# Patient Record
Sex: MALE | Race: WHITE | NOT HISPANIC OR LATINO | Employment: OTHER | URBAN - METROPOLITAN AREA
[De-identification: names, ages, dates, MRNs, and addresses within clinical notes are randomized per-mention and may not be internally consistent; named-entity substitution may affect disease eponyms.]

---

## 2017-03-28 DIAGNOSIS — E78.2 HYPERLIPIDEMIA, MIXED: ICD-10-CM

## 2017-03-28 DIAGNOSIS — E78.2 MIXED HYPERLIPIDEMIA: ICD-10-CM

## 2017-03-28 DIAGNOSIS — I21.09: ICD-10-CM

## 2017-03-28 RX ORDER — ROSUVASTATIN CALCIUM 40 MG/1
40 TABLET, COATED ORAL
Qty: 90 TAB | Refills: 3 | OUTPATIENT
Start: 2017-03-28 | End: 2017-09-26

## 2017-03-29 ENCOUNTER — TELEPHONE (OUTPATIENT)
Dept: CARDIOLOGY | Facility: MEDICAL CENTER | Age: 67
End: 2017-03-29

## 2017-05-09 DIAGNOSIS — I10 ESSENTIAL HYPERTENSION: ICD-10-CM

## 2017-05-09 DIAGNOSIS — E78.2 HYPERLIPIDEMIA, MIXED: ICD-10-CM

## 2017-05-09 DIAGNOSIS — I25.10 CORONARY ARTERY DISEASE INVOLVING NATIVE CORONARY ARTERY OF NATIVE HEART WITHOUT ANGINA PECTORIS: ICD-10-CM

## 2017-05-09 DIAGNOSIS — Z12.5 SCREENING PSA (PROSTATE SPECIFIC ANTIGEN): ICD-10-CM

## 2017-05-16 DIAGNOSIS — I10 ESSENTIAL HYPERTENSION: ICD-10-CM

## 2017-05-16 DIAGNOSIS — I21.09: ICD-10-CM

## 2017-05-16 RX ORDER — CARVEDILOL 3.12 MG/1
3.12 TABLET ORAL 2 TIMES DAILY WITH MEALS
Qty: 180 TAB | Refills: 3 | OUTPATIENT
Start: 2017-05-16 | End: 2017-09-26

## 2017-05-19 ENCOUNTER — HOSPITAL ENCOUNTER (OUTPATIENT)
Dept: LAB | Facility: MEDICAL CENTER | Age: 67
End: 2017-05-19
Attending: INTERNAL MEDICINE
Payer: MEDICARE

## 2017-05-19 DIAGNOSIS — I25.10 CORONARY ARTERY DISEASE INVOLVING NATIVE CORONARY ARTERY OF NATIVE HEART WITHOUT ANGINA PECTORIS: ICD-10-CM

## 2017-05-19 DIAGNOSIS — Z12.5 SCREENING PSA (PROSTATE SPECIFIC ANTIGEN): ICD-10-CM

## 2017-05-19 DIAGNOSIS — E78.2 HYPERLIPIDEMIA, MIXED: ICD-10-CM

## 2017-05-19 DIAGNOSIS — I10 ESSENTIAL HYPERTENSION: ICD-10-CM

## 2017-05-19 LAB
ALBUMIN SERPL BCP-MCNC: 4.4 G/DL (ref 3.2–4.9)
ALBUMIN/GLOB SERPL: 1.8 G/DL
ALP SERPL-CCNC: 43 U/L (ref 30–99)
ALT SERPL-CCNC: 21 U/L (ref 2–50)
ANION GAP SERPL CALC-SCNC: 6 MMOL/L (ref 0–11.9)
AST SERPL-CCNC: 18 U/L (ref 12–45)
BILIRUB SERPL-MCNC: 1.1 MG/DL (ref 0.1–1.5)
BUN SERPL-MCNC: 16 MG/DL (ref 8–22)
CALCIUM SERPL-MCNC: 9.2 MG/DL (ref 8.5–10.5)
CHLORIDE SERPL-SCNC: 109 MMOL/L (ref 96–112)
CHOLEST SERPL-MCNC: 123 MG/DL (ref 100–199)
CO2 SERPL-SCNC: 26 MMOL/L (ref 20–33)
CREAT SERPL-MCNC: 1.01 MG/DL (ref 0.5–1.4)
GFR SERPL CREATININE-BSD FRML MDRD: >60 ML/MIN/1.73 M 2
GLOBULIN SER CALC-MCNC: 2.5 G/DL (ref 1.9–3.5)
GLUCOSE SERPL-MCNC: 90 MG/DL (ref 65–99)
HDLC SERPL-MCNC: 50 MG/DL
LDLC SERPL CALC-MCNC: 58 MG/DL
POTASSIUM SERPL-SCNC: 3.9 MMOL/L (ref 3.6–5.5)
PROT SERPL-MCNC: 6.9 G/DL (ref 6–8.2)
PSA SERPL-MCNC: 2.72 NG/ML (ref 0–4)
SODIUM SERPL-SCNC: 141 MMOL/L (ref 135–145)
TRIGL SERPL-MCNC: 75 MG/DL (ref 0–149)

## 2017-05-19 PROCEDURE — 36415 COLL VENOUS BLD VENIPUNCTURE: CPT | Mod: GA

## 2017-05-19 PROCEDURE — 80061 LIPID PANEL: CPT

## 2017-05-19 PROCEDURE — 80053 COMPREHEN METABOLIC PANEL: CPT

## 2017-05-19 PROCEDURE — 84153 ASSAY OF PSA TOTAL: CPT | Mod: GA

## 2017-05-26 ENCOUNTER — OFFICE VISIT (OUTPATIENT)
Dept: CARDIOLOGY | Facility: MEDICAL CENTER | Age: 67
End: 2017-05-26
Payer: MEDICARE

## 2017-05-26 VITALS
WEIGHT: 183 LBS | SYSTOLIC BLOOD PRESSURE: 118 MMHG | HEIGHT: 74 IN | DIASTOLIC BLOOD PRESSURE: 80 MMHG | BODY MASS INDEX: 23.49 KG/M2 | HEART RATE: 70 BPM

## 2017-05-26 DIAGNOSIS — I25.10 CORONARY ARTERY DISEASE INVOLVING NATIVE CORONARY ARTERY OF NATIVE HEART WITHOUT ANGINA PECTORIS: Chronic | ICD-10-CM

## 2017-05-26 DIAGNOSIS — E78.2 HYPERLIPIDEMIA, MIXED: ICD-10-CM

## 2017-05-26 DIAGNOSIS — I10 ESSENTIAL HYPERTENSION: ICD-10-CM

## 2017-05-26 LAB — EKG IMPRESSION: NORMAL

## 2017-05-26 PROCEDURE — 4040F PNEUMOC VAC/ADMIN/RCVD: CPT | Mod: 8P | Performed by: INTERNAL MEDICINE

## 2017-05-26 PROCEDURE — 3017F COLORECTAL CA SCREEN DOC REV: CPT | Mod: 8P | Performed by: INTERNAL MEDICINE

## 2017-05-26 PROCEDURE — G8432 DEP SCR NOT DOC, RNG: HCPCS | Performed by: INTERNAL MEDICINE

## 2017-05-26 PROCEDURE — G8420 CALC BMI NORM PARAMETERS: HCPCS | Performed by: INTERNAL MEDICINE

## 2017-05-26 PROCEDURE — 1036F TOBACCO NON-USER: CPT | Performed by: INTERNAL MEDICINE

## 2017-05-26 PROCEDURE — 1101F PT FALLS ASSESS-DOCD LE1/YR: CPT | Mod: 8P | Performed by: INTERNAL MEDICINE

## 2017-05-26 PROCEDURE — 99214 OFFICE O/P EST MOD 30 MIN: CPT | Performed by: INTERNAL MEDICINE

## 2017-05-26 PROCEDURE — 93000 ELECTROCARDIOGRAM COMPLETE: CPT | Performed by: INTERNAL MEDICINE

## 2017-05-26 PROCEDURE — G8598 ASA/ANTIPLAT THER USED: HCPCS | Performed by: INTERNAL MEDICINE

## 2017-05-26 NOTE — MR AVS SNAPSHOT
"        Samuel Luong   2017 9:45 AM   Office Visit   MRN: [id number]    Department:  [facility]   Dept Phone:  [phone]    Description:  Male : 1950   Provider:  Beny Phillips M.D.           Reason for Visit     Follow-Up           Allergies as of 2017     Allergen Noted Reactions    Penicillins 2011       Statins [Hmg-Coa-R Inhibitors] 2011       myalgia      You were diagnosed with     Essential hypertension   [4033928]         Vital Signs     Blood Pressure Pulse Height Weight Body Mass Index Smoking Status    118/80 mmHg 70 1.892 m (6' 2.49\") 83.008 kg (183 lb) 23.19 kg/m2 Never Smoker       Basic Information     Date Of Birth Sex Race Ethnicity Preferred Language    1950 Male White Non- English      Your appointments     2018  3:00 PM   FOLLOW UP with Beny Phillips M.D.   [facility] (--)    [address], [address]  [facility] [address]   [phone]              Problem List              ICD-10-CM Priority Class Noted - Resolved    Acute MI, anterolateral wall (CMS-HCC) I21.09   Unknown - Present    Presence of drug coated stent in LAD coronary artery Z95.5   Unknown - Present    Chest pain R07.9   Unknown - Present    Hyperlipidemia, mixed E78.2   Unknown - Present    CAD (coronary artery disease) I25.10   2011 - Present    HTN (hypertension) I10   2013 - Present    Screening PSA (prostate specific antigen) Z12.5   2016 - Present      Health Maintenance        Date Due Completion Dates    IMM DTaP/Tdap/Td Vaccine (1 - Tdap) 1967 ---    COLONOSCOPY 1998 ---    IMM ZOSTER VACCINE 2008 ---    IMM PNEUMOCOCCAL 65+ (ADULT) LOW/MEDIUM RISK SERIES (1 of 2 - PCV13) 2013 ---            Results       Current Immunizations     No immunizations on file.      Below and/or attached are the medications your provider expects you to take. Review all of your home medications and " newly ordered medications with your provider and/or pharmacist. Follow medication instructions as directed by your provider and/or pharmacist. Please keep your medication list with you and share with your provider. Update the information when medications are discontinued, doses are changed, or new medications (including over-the-counter products) are added; and carry medication information at all times in the event of emergency situations     Allergies:  PENICILLINS - (reactions not documented)     STATINS - (reactions not documented)               Medications  Valid as of: May 26, 2017 - 10:14 AM    Generic Name Brand Name Tablet Size Instructions for use    Aspirin (Tab)  MG Take 325 mg by mouth every day.        Carvedilol (Tab) COREG 3.125 MG Take 1 Tab by mouth 2 times a day, with meals.        Niacin (Antihyperlipidemic) (Tab CR) NIASPAN 500 MG TAKE 1 TABLET BY MOUTH EVERY DAY        Rosuvastatin Calcium (Tab) CRESTOR 40 MG Take 1 Tab by mouth every day.        .                 Medicines prescribed today were sent to:     [facility] [address] [place] [address] & [address] [address]    Phone: [phone] Fax: [phone]    Open 24 Hours?: No      Medication refill instructions:       If your prescription bottle indicates you have medication refills left, it is not necessary to call your provider’s office. Please contact your pharmacy and they will refill your medication.    If your prescription bottle indicates you do not have any refills left, you may request refills at any time through one of the following ways: The online [employer] system (except Urgent Care), by calling your provider’s office, or by asking your pharmacy to contact your provider’s office with a refill request. Medication refills are processed only during regular business hours and may not be available until the next business day. Your provider may request additional  information or to have a follow-up visit with you prior to refilling your medication.   *Please Note: Medication refills are assigned a new Rx number when refilled electronically. Your pharmacy may indicate that no refills were authorized even though a new prescription for the same medication is available at the pharmacy. Please request the medicine by name with the pharmacy before contacting your provider for a refill.           [employer] Access Code: Activation code not generated  Current [employer] Status: Active

## 2017-05-26 NOTE — PROGRESS NOTES
Subjective:   Samuel Luong is a 67 y.o. male who presents today for our initial visit and follow-up of CAD. Former patient of Dr. Keita. Has a history of mixed hyperlipidemia and mild hypertension and experienced a and anterolateral apical MI 2009 and received a SIMA to the proximal LAD. He has preserved left ventricular ejection fraction. Tolerating his medical therapy well. Blood pressure is under excellent control. He exercises 3 times per week, also twice a week on top of this, has no cardio vascular symptoms and is very interested in his health.    Denies any other cardiovascular symptoms including chest pain, shortness of breath, dyspnea on exertion, lightheadedness, syncope or presyncope, lower extremity edema, PND, orthopnea or palpitations.    Today I reviewed the medical, surgical, social and family histories with the patient. As per HPI, otherwise noncontributory.      Past Medical History   Diagnosis Date   • Acute MI, anterolateral wall (CMS-HCC)      Robert-lateral-apical MI   • Presence of drug coated stent in LAD coronary artery      2009 3.0 x 18mm drug eluting stent too proximal LAD   • Chest pain, unspecified    • Hyperlipidemia, mixed    • CAD (coronary artery disease) 12/21/2011   • HTN (hypertension) 2/5/2013     History reviewed. No pertinent past surgical history.  Family History   Problem Relation Age of Onset   • Non-contributory Mother    • Non-contributory Father      History   Smoking status   • Never Smoker    Smokeless tobacco   • Not on file     Allergies   Allergen Reactions   • Penicillins    • Statins [Hmg-Coa-R Inhibitors]      myalgia     Outpatient Encounter Prescriptions as of 5/26/2017   Medication Sig Dispense Refill   • carvedilol (COREG) 3.125 MG Tab Take 1 Tab by mouth 2 times a day, with meals. 180 Tab 3   • rosuvastatin (CRESTOR) 40 MG tablet Take 1 Tab by mouth every day. 90 Tab 3   • niacin SR (NIASPAN) 500 MG Tab CR TAKE 1 TABLET BY MOUTH EVERY DAY 90 Tab 3  "  • aspirin (ASA) 325 MG TABS Take 325 mg by mouth every day.       No facility-administered encounter medications on file as of 5/26/2017.     Review of Systems   All other systems reviewed and are negative.       Objective:   /80 mmHg  Pulse 70  Ht 1.892 m (6' 2.49\")  Wt 83.008 kg (183 lb)  BMI 23.19 kg/m2    Physical Exam   Constitutional: He is oriented to person, place, and time. He appears well-developed and well-nourished. No distress.   Tall thin and athletic appearing   HENT:   Head: Normocephalic and atraumatic.   Mouth/Throat: Oropharynx is clear and moist.   Eyes: Conjunctivae are normal. Pupils are equal, round, and reactive to light. No scleral icterus.   Neck: No JVD present. No tracheal deviation present. No thyromegaly present.   Cardiovascular: Normal rate, regular rhythm, S1 normal, normal heart sounds and intact distal pulses.  PMI is not displaced.  Exam reveals no gallop and no friction rub.    No murmur heard.  Pulses:       Carotid pulses are 2+ on the right side, and 2+ on the left side.       Radial pulses are 2+ on the right side, and 2+ on the left side.        Dorsalis pedis pulses are 2+ on the right side, and 2+ on the left side.        Posterior tibial pulses are 2+ on the right side, and 2+ on the left side.   Pulmonary/Chest: Effort normal and breath sounds normal. He has no wheezes. He has no rales.   Abdominal: Soft. Bowel sounds are normal. He exhibits no distension and no pulsatile midline mass. There is no tenderness. There is no guarding.   Musculoskeletal: He exhibits no edema.   Neurological: He is alert and oriented to person, place, and time. No cranial nerve deficit (cranial nerves II through XII grossly intact).   Skin: Skin is warm and dry. No rash noted. He is not diaphoretic. No erythema.   Psychiatric: He has a normal mood and affect. His behavior is normal. Thought content normal.     LABS:  Lab Results   Component Value Date/Time    CHOLESTEROL, " 05/19/2017 08:08 AM    LDL 58 05/19/2017 08:08 AM    HDL 50 05/19/2017 08:08 AM    TRIGLYCERIDES 75 05/19/2017 08:08 AM       Lab Results   Component Value Date/Time    WBC 5.8 07/23/2013 06:55 AM    RBC 4.97 07/23/2013 06:55 AM    HEMOGLOBIN 15.9 07/23/2013 06:55 AM    HEMATOCRIT 48.3 07/23/2013 06:55 AM    MCV 97 07/23/2013 06:55 AM    NEUTROPHILS-POLYS 58 07/23/2013 06:55 AM    LYMPHOCYTES 30 07/23/2013 06:55 AM    MONOCYTES 9 07/23/2013 06:55 AM    EOSINOPHILS 3 07/23/2013 06:55 AM    BASOPHILS 0 07/23/2013 06:55 AM     Lab Results   Component Value Date/Time    SODIUM 141 05/19/2017 08:08 AM    POTASSIUM 3.9 05/19/2017 08:08 AM    CHLORIDE 109 05/19/2017 08:08 AM    CO2 26 05/19/2017 08:08 AM    GLUCOSE 90 05/19/2017 08:08 AM    BUN 16 05/19/2017 08:08 AM    CREATININE 1.01 05/19/2017 08:08 AM    BUN-CREATININE RATIO 13 08/14/2014 06:50 AM         Lab Results   Component Value Date/Time    ALKALINE PHOSPHATASE 43 05/19/2017 08:08 AM    AST(SGOT) 18 05/19/2017 08:08 AM    ALT(SGPT) 21 05/19/2017 08:08 AM    TOTAL BILIRUBIN 1.1 05/19/2017 08:08 AM      No results found for: BNPBTYPENAT   No results found for: TSH  No results found for: PROTHROMBTM, INR         EKG (5/26/2017):  I have personally reviewed the EKG this visit and discussed with the patient. It shows sinus rhythm 70, LVH, anterior MI old    Assessment:     1. CAD s/p SIMA LAD 2009      Anterior MI  Normal LVEF   2. Essential hypertension  EKG   3. Hyperlipidemia, mixed         Medical Decision Making:  Today's Assessment / Status / Plan:     He is doing exceptionally well. Cholesterol profile is excellent as is his metabolic panel. Diet and activity choices are exceptional. We discussed the addition of resistance training to his otherwise excellent workout regimen as he is approaching 68 years of age now.    Recommend continuing current medical therapy and following up in 1 year with labs.

## 2017-08-14 DIAGNOSIS — E78.00 PURE HYPERCHOLESTEROLEMIA: ICD-10-CM

## 2017-08-15 RX ORDER — NIACIN 500 MG/1
500 TABLET, EXTENDED RELEASE ORAL
Qty: 90 TAB | Refills: 3 | Status: SHIPPED | OUTPATIENT
Start: 2017-08-15 | End: 2018-08-15 | Stop reason: SDUPTHER

## 2017-09-24 DIAGNOSIS — I21.09: ICD-10-CM

## 2017-09-24 DIAGNOSIS — E78.2 MIXED HYPERLIPIDEMIA: ICD-10-CM

## 2017-09-24 DIAGNOSIS — I10 ESSENTIAL HYPERTENSION: ICD-10-CM

## 2017-09-24 DIAGNOSIS — E78.2 HYPERLIPIDEMIA, MIXED: ICD-10-CM

## 2017-09-26 RX ORDER — ROSUVASTATIN CALCIUM 40 MG/1
40 TABLET, COATED ORAL DAILY
Qty: 90 TAB | Refills: 3 | OUTPATIENT
Start: 2017-09-26 | End: 2018-10-09

## 2017-09-26 RX ORDER — CARVEDILOL 3.12 MG/1
3.12 TABLET ORAL 2 TIMES DAILY WITH MEALS
Qty: 180 TAB | Refills: 3 | OUTPATIENT
Start: 2017-09-26 | End: 2019-12-02

## 2017-09-26 NOTE — TELEPHONE ENCOUNTER
Patient called requesting refills. Educated that refill will be called in today. Pt appreciative of assistance.     Coreg 3.125 BID #180 with 3 refills and Crestor 40mg #90 with 3 refills called into pt's pharmacy.

## 2018-01-19 ENCOUNTER — TELEPHONE (OUTPATIENT)
Dept: CARDIOLOGY | Facility: MEDICAL CENTER | Age: 68
End: 2018-01-19

## 2018-01-19 DIAGNOSIS — Z12.5 SCREENING PSA (PROSTATE SPECIFIC ANTIGEN): ICD-10-CM

## 2018-01-19 DIAGNOSIS — I10 ESSENTIAL HYPERTENSION: ICD-10-CM

## 2018-01-19 DIAGNOSIS — I25.10 CORONARY ARTERY DISEASE INVOLVING NATIVE CORONARY ARTERY OF NATIVE HEART WITHOUT ANGINA PECTORIS: ICD-10-CM

## 2018-01-19 DIAGNOSIS — E78.2 HYPERLIPIDEMIA, MIXED: ICD-10-CM

## 2018-01-19 NOTE — TELEPHONE ENCOUNTER
Message sent to Dr. Phillips to advise on adding PSA to annual labs.    ----- Message from Kasia Bagley sent at 1/19/2018  8:00 AM PST -----  Regarding: mikanet needs labs ordered for 01/30 appt  TW/Miri      Patient needs labs ordered for his 01/30 appt. He needs PSA added to the labwork. [facility] [address]. He can be reached at [phone].

## 2018-01-20 NOTE — TELEPHONE ENCOUNTER
Orders placed for CMP, Lipid, and PSA per Dr. Phillips.     Pt notified.     Beny Phillips M.D.  Miri Orellana R.N.      OK

## 2018-01-26 ENCOUNTER — HOSPITAL ENCOUNTER (OUTPATIENT)
Dept: LAB | Facility: MEDICAL CENTER | Age: 68
End: 2018-01-26
Attending: INTERNAL MEDICINE
Payer: MEDICARE

## 2018-01-26 DIAGNOSIS — E78.2 HYPERLIPIDEMIA, MIXED: ICD-10-CM

## 2018-01-26 DIAGNOSIS — Z12.5 SCREENING PSA (PROSTATE SPECIFIC ANTIGEN): ICD-10-CM

## 2018-01-26 DIAGNOSIS — I10 ESSENTIAL HYPERTENSION: ICD-10-CM

## 2018-01-26 DIAGNOSIS — I25.10 CORONARY ARTERY DISEASE INVOLVING NATIVE CORONARY ARTERY OF NATIVE HEART WITHOUT ANGINA PECTORIS: ICD-10-CM

## 2018-01-26 LAB
ALBUMIN SERPL BCP-MCNC: 4.3 G/DL (ref 3.2–4.9)
ALBUMIN/GLOB SERPL: 1.8 G/DL
ALP SERPL-CCNC: 44 U/L (ref 30–99)
ALT SERPL-CCNC: 18 U/L (ref 2–50)
ANION GAP SERPL CALC-SCNC: 7 MMOL/L (ref 0–11.9)
AST SERPL-CCNC: 19 U/L (ref 12–45)
BILIRUB SERPL-MCNC: 1 MG/DL (ref 0.1–1.5)
BUN SERPL-MCNC: 15 MG/DL (ref 8–22)
CALCIUM SERPL-MCNC: 8.9 MG/DL (ref 8.5–10.5)
CHLORIDE SERPL-SCNC: 108 MMOL/L (ref 96–112)
CHOLEST SERPL-MCNC: 123 MG/DL (ref 100–199)
CO2 SERPL-SCNC: 26 MMOL/L (ref 20–33)
CREAT SERPL-MCNC: 1 MG/DL (ref 0.5–1.4)
GLOBULIN SER CALC-MCNC: 2.4 G/DL (ref 1.9–3.5)
GLUCOSE SERPL-MCNC: 91 MG/DL (ref 65–99)
HDLC SERPL-MCNC: 41 MG/DL
LDLC SERPL CALC-MCNC: 61 MG/DL
POTASSIUM SERPL-SCNC: 4 MMOL/L (ref 3.6–5.5)
PROT SERPL-MCNC: 6.7 G/DL (ref 6–8.2)
PSA SERPL-MCNC: 2.12 NG/ML (ref 0–4)
SODIUM SERPL-SCNC: 141 MMOL/L (ref 135–145)
TRIGL SERPL-MCNC: 106 MG/DL (ref 0–149)

## 2018-01-26 PROCEDURE — 80053 COMPREHEN METABOLIC PANEL: CPT

## 2018-01-26 PROCEDURE — 84153 ASSAY OF PSA TOTAL: CPT | Mod: GA

## 2018-01-26 PROCEDURE — 36415 COLL VENOUS BLD VENIPUNCTURE: CPT

## 2018-01-26 PROCEDURE — 80061 LIPID PANEL: CPT

## 2018-01-30 ENCOUNTER — OFFICE VISIT (OUTPATIENT)
Dept: CARDIOLOGY | Facility: MEDICAL CENTER | Age: 68
End: 2018-01-30
Payer: MEDICARE

## 2018-01-30 VITALS
WEIGHT: 186 LBS | DIASTOLIC BLOOD PRESSURE: 80 MMHG | HEART RATE: 72 BPM | HEIGHT: 74 IN | BODY MASS INDEX: 23.87 KG/M2 | SYSTOLIC BLOOD PRESSURE: 142 MMHG | OXYGEN SATURATION: 95 %

## 2018-01-30 DIAGNOSIS — I10 ESSENTIAL HYPERTENSION: ICD-10-CM

## 2018-01-30 DIAGNOSIS — E78.2 HYPERLIPIDEMIA, MIXED: ICD-10-CM

## 2018-01-30 DIAGNOSIS — I25.10 CORONARY ARTERY DISEASE INVOLVING NATIVE CORONARY ARTERY OF NATIVE HEART WITHOUT ANGINA PECTORIS: ICD-10-CM

## 2018-01-30 PROCEDURE — 99213 OFFICE O/P EST LOW 20 MIN: CPT | Performed by: INTERNAL MEDICINE
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
## 2018-04-25 ENCOUNTER — TELEPHONE (OUTPATIENT)
Dept: CARDIOLOGY | Facility: MEDICAL CENTER | Age: 68
End: 2018-04-25

## 2018-04-25 NOTE — TELEPHONE ENCOUNTER
----- Message from Marielle Das R.N. sent at 4/25/2018 10:33 AM PDT -----  Regarding: FW: insurance denied his Crestor refill    ----- Message -----  From: Kasia Bagley  Sent: 4/25/2018  10:00 AM  To: Miri Orellana R.N.  Subject: insurance denied his Crestor refill              HOWIE/Miri    Patient said his Crestor was refused by his insurance. He said he can't take the generic because it doesn't work for him as well as the name brand. He can be reached at [phone].

## 2018-04-25 NOTE — TELEPHONE ENCOUNTER
PAR approved:  Samuel Luong Mondragon: [id number] - PA Case ID: [id number]  Outcome  Approvedtoday  Request Reference Number: [id number]. CRESTOR TAB 40MG is approved through 12/31/2018. For further questions, call [phone].  DrugCrestor 40MG tablets  [facility] Medicare Part D Electronic Prior Authorization Form

## 2018-08-15 DIAGNOSIS — E78.00 PURE HYPERCHOLESTEROLEMIA: ICD-10-CM

## 2018-08-15 RX ORDER — NIACIN 500 MG/1
TABLET, EXTENDED RELEASE ORAL
Qty: 90 TAB | Refills: 3 | Status: SHIPPED | OUTPATIENT
Start: 2018-08-15 | End: 2019-08-07 | Stop reason: SDUPTHER

## 2018-09-10 ENCOUNTER — TELEPHONE (OUTPATIENT)
Dept: CARDIOLOGY | Facility: MEDICAL CENTER | Age: 68
End: 2018-09-10

## 2018-09-10 DIAGNOSIS — I10 ESSENTIAL HYPERTENSION: ICD-10-CM

## 2018-09-10 DIAGNOSIS — I25.10 CORONARY ARTERY DISEASE INVOLVING NATIVE CORONARY ARTERY OF NATIVE HEART WITHOUT ANGINA PECTORIS: ICD-10-CM

## 2018-09-10 DIAGNOSIS — Z12.5 SCREENING PSA (PROSTATE SPECIFIC ANTIGEN): ICD-10-CM

## 2018-09-10 DIAGNOSIS — E78.2 HYPERLIPIDEMIA, MIXED: ICD-10-CM

## 2018-09-10 NOTE — TELEPHONE ENCOUNTER
Annual lab orders placed per pt's request. TW ordered PSA last 1/2018, repeat lab placed at this time.     Lab slips mailed to pt's home address.     ----- Message from Nida Mendes sent at 9/10/2018  1:14 PM PDT -----  Regarding: lab slip to include PSA  Contact: [phone]  HOWIE/eloise    Pt calling to ask you to order his blood work and is requesting you include a PSA in preparation for 10/9 appt with TW.Please mail to pt.   Please call pt if questions [phone]    Please mail to:  Samuel Luong  [address] [place] [address]

## 2018-09-17 DIAGNOSIS — I21.09: ICD-10-CM

## 2018-09-17 DIAGNOSIS — I10 ESSENTIAL HYPERTENSION: ICD-10-CM

## 2018-09-18 RX ORDER — CARVEDILOL 3.12 MG/1
TABLET ORAL
Qty: 180 TAB | Refills: 2 | Status: SHIPPED | OUTPATIENT
Start: 2018-09-18 | End: 2018-10-09

## 2018-09-21 DIAGNOSIS — E78.2 HYPERLIPIDEMIA, MIXED: ICD-10-CM

## 2018-09-21 DIAGNOSIS — E78.2 MIXED HYPERLIPIDEMIA: ICD-10-CM

## 2018-09-25 ENCOUNTER — HOSPITAL ENCOUNTER (OUTPATIENT)
Dept: LAB | Facility: MEDICAL CENTER | Age: 68
End: 2018-09-25
Attending: INTERNAL MEDICINE
Payer: MEDICARE

## 2018-09-25 DIAGNOSIS — Z12.5 SCREENING PSA (PROSTATE SPECIFIC ANTIGEN): ICD-10-CM

## 2018-09-25 DIAGNOSIS — I25.10 CORONARY ARTERY DISEASE INVOLVING NATIVE CORONARY ARTERY OF NATIVE HEART WITHOUT ANGINA PECTORIS: ICD-10-CM

## 2018-09-25 DIAGNOSIS — E78.2 HYPERLIPIDEMIA, MIXED: ICD-10-CM

## 2018-09-25 DIAGNOSIS — I10 ESSENTIAL HYPERTENSION: ICD-10-CM

## 2018-09-25 LAB
ALBUMIN SERPL BCP-MCNC: 4.2 G/DL (ref 3.2–4.9)
ALBUMIN/GLOB SERPL: 1.4 G/DL
ALP SERPL-CCNC: 47 U/L (ref 30–99)
ALT SERPL-CCNC: 25 U/L (ref 2–50)
ANION GAP SERPL CALC-SCNC: 8 MMOL/L (ref 0–11.9)
AST SERPL-CCNC: 21 U/L (ref 12–45)
BILIRUB SERPL-MCNC: 0.7 MG/DL (ref 0.1–1.5)
BUN SERPL-MCNC: 16 MG/DL (ref 8–22)
CALCIUM SERPL-MCNC: 9.6 MG/DL (ref 8.5–10.5)
CHLORIDE SERPL-SCNC: 108 MMOL/L (ref 96–112)
CHOLEST SERPL-MCNC: 127 MG/DL (ref 100–199)
CO2 SERPL-SCNC: 25 MMOL/L (ref 20–33)
CREAT SERPL-MCNC: 1.08 MG/DL (ref 0.5–1.4)
FASTING STATUS PATIENT QL REPORTED: NORMAL
GLOBULIN SER CALC-MCNC: 3.1 G/DL (ref 1.9–3.5)
GLUCOSE SERPL-MCNC: 101 MG/DL (ref 65–99)
HDLC SERPL-MCNC: 40 MG/DL
LDLC SERPL CALC-MCNC: 68 MG/DL
POTASSIUM SERPL-SCNC: 4.1 MMOL/L (ref 3.6–5.5)
PROT SERPL-MCNC: 7.3 G/DL (ref 6–8.2)
SODIUM SERPL-SCNC: 141 MMOL/L (ref 135–145)
TRIGL SERPL-MCNC: 95 MG/DL (ref 0–149)

## 2018-09-25 PROCEDURE — 80053 COMPREHEN METABOLIC PANEL: CPT

## 2018-09-25 PROCEDURE — 84153 ASSAY OF PSA TOTAL: CPT | Mod: GA

## 2018-09-25 PROCEDURE — 36415 COLL VENOUS BLD VENIPUNCTURE: CPT

## 2018-09-25 PROCEDURE — 80061 LIPID PANEL: CPT

## 2018-09-25 RX ORDER — ROSUVASTATIN CALCIUM 40 MG/1
TABLET, FILM COATED ORAL
Qty: 90 TAB | Refills: 2 | Status: SHIPPED | OUTPATIENT
Start: 2018-09-25 | End: 2019-05-15

## 2018-09-26 ENCOUNTER — TELEPHONE (OUTPATIENT)
Dept: CARDIOLOGY | Facility: MEDICAL CENTER | Age: 68
End: 2018-09-26

## 2018-09-26 LAB — PSA SERPL-MCNC: 1.9 NG/ML (ref 0–4)

## 2018-09-28 DIAGNOSIS — E78.2 MIXED HYPERLIPIDEMIA: ICD-10-CM

## 2018-09-28 DIAGNOSIS — E78.2 HYPERLIPIDEMIA, MIXED: ICD-10-CM

## 2018-10-09 ENCOUNTER — OFFICE VISIT (OUTPATIENT)
Dept: CARDIOLOGY | Facility: MEDICAL CENTER | Age: 68
End: 2018-10-09
Payer: MEDICARE

## 2018-10-09 VITALS
OXYGEN SATURATION: 97 % | SYSTOLIC BLOOD PRESSURE: 177 MMHG | BODY MASS INDEX: 24.64 KG/M2 | DIASTOLIC BLOOD PRESSURE: 95 MMHG | HEIGHT: 74 IN | WEIGHT: 192 LBS | HEART RATE: 80 BPM

## 2018-10-09 DIAGNOSIS — I25.10 CORONARY ARTERY DISEASE INVOLVING NATIVE CORONARY ARTERY OF NATIVE HEART WITHOUT ANGINA PECTORIS: ICD-10-CM

## 2018-10-09 DIAGNOSIS — I10 ESSENTIAL HYPERTENSION: ICD-10-CM

## 2018-10-09 PROCEDURE — 99213 OFFICE O/P EST LOW 20 MIN: CPT | Performed by: INTERNAL MEDICINE

## 2018-10-09 RX ORDER — ROSUVASTATIN CALCIUM 40 MG/1
40 TABLET, COATED ORAL DAILY
Qty: 90 TAB | Refills: 3 | Status: SHIPPED | OUTPATIENT
Start: 2018-10-09 | End: 2019-05-15 | Stop reason: SDUPTHER

## 2018-10-09 NOTE — PROGRESS NOTES
Subjective:   Samuel Luong is a 67 y.o. male who presents today for follow-up of CAD. Former patient of Dr. Keita. Has a history of mixed hyperlipidemia and mild hypertension and experienced a and anterolateral apical MI 2009 and received a SIMA to the proximal LAD. He has preserved left ventricular ejection fraction. Tolerating his medical therapy well. Blood pressure is under excellent control. He exercises 3 times per week, also twice a week on top of this, has no cardio vascular symptoms and is very interested in his health.    Since last visit continues to feel well with no cardiac symptoms actively exercising and work on his diet and lifestyle.  He has had shingles which she is recovered from and cystoscopy which was unremarkable.    Today I reviewed the medical, surgical, social and family histories with the patient. As per HPI, otherwise noncontributory.      Past Medical History:   Diagnosis Date   • Acute MI, anterolateral wall (HCC)     Robert-lateral-apical MI   • CAD (coronary artery disease) 12/21/2011   • Chest pain, unspecified    • HTN (hypertension) 2/5/2013   • Hyperlipidemia, mixed    • Presence of drug coated stent in LAD coronary artery     2009 3.0 x 18mm drug eluting stent too proximal LAD     No past surgical history on file.  Family History   Problem Relation Age of Onset   • Non-contributory Mother    • Non-contributory Father      History   Smoking Status   • Never Smoker   Smokeless Tobacco   • Never Used     Allergies   Allergen Reactions   • Penicillins    • Statins [Hmg-Coa-R Inhibitors]      myalgia     Outpatient Encounter Prescriptions as of 10/9/2018   Medication Sig Dispense Refill   • CRESTOR 40 MG tablet TAKE 1 TABLET BY MOUTH EVERY DAY 90 Tab 2   • niacin SR (NIASPAN) 500 MG Tab CR TAKE 1 TABLET BY MOUTH EVERY DAY 90 Tab 3   • carvedilol (COREG) 3.125 MG Tab Take 1 Tab by mouth 2 times a day, with meals. 180 Tab 3   • aspirin (ASA) 325 MG TABS Take 325 mg by mouth  "every day.     • [DISCONTINUED] carvedilol (COREG) 3.125 MG Tab TAKE 1 TABLET BY MOUTH TWICE DAILY WITH MEALS 180 Tab 2   • [DISCONTINUED] rosuvastatin (CRESTOR) 40 MG tablet Take 1 Tab by mouth every day. 90 Tab 3     No facility-administered encounter medications on file as of 10/9/2018.      Review of Systems   All other systems reviewed and are negative.       Objective:   BP (!) 177/95 (BP Location: Left arm, Patient Position: Sitting)   Pulse 80   Ht 1.88 m (6' 2\")   Wt 87.1 kg (192 lb)   SpO2 97%   BMI 24.65 kg/m²     Physical Exam   Constitutional: He is oriented to person, place, and time. He appears well-developed and well-nourished. No distress.   Tall thin and athletic appearing   HENT:   Head: Normocephalic and atraumatic.   Mouth/Throat: Oropharynx is clear and moist.   Eyes: Pupils are equal, round, and reactive to light. Conjunctivae are normal. No scleral icterus.   Neck: No JVD present. No tracheal deviation present. No thyromegaly present.   Cardiovascular: Normal rate, regular rhythm, S1 normal, normal heart sounds and intact distal pulses.  PMI is not displaced.  Exam reveals no gallop and no friction rub.    No murmur heard.  Pulses:       Carotid pulses are 2+ on the right side, and 2+ on the left side.       Radial pulses are 2+ on the right side, and 2+ on the left side.        Dorsalis pedis pulses are 2+ on the right side, and 2+ on the left side.        Posterior tibial pulses are 2+ on the right side, and 2+ on the left side.   Pulmonary/Chest: Effort normal and breath sounds normal. He has no wheezes. He has no rales.   Abdominal: Soft. Bowel sounds are normal. He exhibits no distension and no pulsatile midline mass. There is no tenderness. There is no guarding.   Musculoskeletal: He exhibits no edema.   Neurological: He is alert and oriented to person, place, and time. No cranial nerve deficit (cranial nerves II through XII grossly intact).   Skin: Skin is warm and dry. No rash " noted. He is not diaphoretic. No erythema.   Psychiatric: He has a normal mood and affect. His behavior is normal. Thought content normal.   No changes in physical exam since 1/30/2018    LABS:  Lab Results   Component Value Date/Time    CHOLSTRLTOT 127 09/25/2018 07:48 AM    LDL 68 09/25/2018 07:48 AM    HDL 40 09/25/2018 07:48 AM    TRIGLYCERIDE 95 09/25/2018 07:48 AM       Lab Results   Component Value Date/Time    WBC 5.8 07/23/2013 06:55 AM    RBC 4.97 07/23/2013 06:55 AM    HEMOGLOBIN 15.9 07/23/2013 06:55 AM    HEMATOCRIT 48.3 07/23/2013 06:55 AM    MCV 97 07/23/2013 06:55 AM    NEUTSPOLYS 58 07/23/2013 06:55 AM    LYMPHOCYTES 30 07/23/2013 06:55 AM    MONOCYTES 9 07/23/2013 06:55 AM    EOSINOPHILS 3 07/23/2013 06:55 AM    BASOPHILS 0 07/23/2013 06:55 AM     Lab Results   Component Value Date/Time    SODIUM 141 09/25/2018 07:48 AM    POTASSIUM 4.1 09/25/2018 07:48 AM    CHLORIDE 108 09/25/2018 07:48 AM    CO2 25 09/25/2018 07:48 AM    GLUCOSE 101 (H) 09/25/2018 07:48 AM    BUN 16 09/25/2018 07:48 AM    CREATININE 1.08 09/25/2018 07:48 AM    CREATININE 1.05 01/22/2013 06:39 AM    BUNCREATRAT 13 08/14/2014 06:50 AM    BUNCREATRAT 14 01/22/2013 06:39 AM         Lab Results   Component Value Date/Time    ALKPHOSPHAT 47 09/25/2018 07:48 AM    ASTSGOT 21 09/25/2018 07:48 AM    ALTSGPT 25 09/25/2018 07:48 AM    TBILIRUBIN 0.7 09/25/2018 07:48 AM      No results found for: BNPBTYPENAT   No results found for: TSH  No results found for: PROTHROMBTM, INR     EKG (5/26/2017):   shows sinus rhythm 70, LVH, anterior MI old    Assessment:     1. CAD s/p SIMA LAD 2009     2. Essential hypertension         Medical Decision Making:  Today's Assessment / Status / Plan:   Is doing very well from a cardiac perspective.  Medications are well and lipids are excellent.  Blood pressure is high today but this is unusual for him.  He checks at multiple times at home and it is always normal and therefore I think this is a outlying value.   Recommended he monitor closely.  Recommended ongoing diet lifestyle management techniques and medical therapy as prescribed.  Monitor blood pressure and adjust as necessary with PCP.        Physical Exam   Constitutional: He is oriented to person, place, and time. He appears well-developed and well-nourished. No distress.   Tall thin and athletic appearing   HENT:   Head: Normocephalic and atraumatic.   Mouth/Throat: Oropharynx is clear and moist.   Eyes: Pupils are equal, round, and reactive to light. Conjunctivae are normal. No scleral icterus.   Neck: No JVD present. No tracheal deviation present. No thyromegaly present.   Cardiovascular: Normal rate, regular rhythm, S1 normal, normal heart sounds and intact distal pulses.  PMI is not displaced.  Exam reveals no gallop and no friction rub.    No murmur heard.  Pulses:       Carotid pulses are 2+ on the right side, and 2+ on the left side.       Radial pulses are 2+ on the right side, and 2+ on the left side.        Dorsalis pedis pulses are 2+ on the right side, and 2+ on the left side.        Posterior tibial pulses are 2+ on the right side, and 2+ on the left side.   Pulmonary/Chest: Effort normal and breath sounds normal. He has no wheezes. He has no rales.   Abdominal: Soft. Bowel sounds are normal. He exhibits no distension and no pulsatile midline mass. There is no tenderness. There is no guarding.   Musculoskeletal: He exhibits no edema.   Neurological: He is alert and oriented to person, place, and time. No cranial nerve deficit (cranial nerves II through XII grossly intact).   Skin: Skin is warm and dry. No rash noted. He is not diaphoretic. No erythema.   Psychiatric: He has a normal mood and affect. His behavior is normal. Thought content normal.   No changes in physical exam since 1/30/2018

## 2019-05-15 DIAGNOSIS — E78.2 HYPERLIPIDEMIA, MIXED: ICD-10-CM

## 2019-05-15 DIAGNOSIS — E78.2 MIXED HYPERLIPIDEMIA: ICD-10-CM

## 2019-05-15 RX ORDER — ROSUVASTATIN CALCIUM 40 MG/1
40 TABLET, COATED ORAL DAILY
Qty: 90 TAB | Refills: 3 | Status: SHIPPED | OUTPATIENT
Start: 2019-05-15 | End: 2020-05-28

## 2019-08-07 DIAGNOSIS — E78.00 PURE HYPERCHOLESTEROLEMIA: ICD-10-CM

## 2019-08-07 RX ORDER — NIACIN 500 MG/1
TABLET, EXTENDED RELEASE ORAL
Qty: 90 TAB | Refills: 1 | Status: SHIPPED | OUTPATIENT
Start: 2019-08-07 | End: 2020-02-26

## 2019-08-30 DIAGNOSIS — I10 ESSENTIAL HYPERTENSION: ICD-10-CM

## 2019-08-30 DIAGNOSIS — I21.09: ICD-10-CM

## 2019-08-30 RX ORDER — CARVEDILOL 3.12 MG/1
TABLET ORAL
Qty: 180 TAB | Refills: 0 | Status: SHIPPED | OUTPATIENT
Start: 2019-08-30 | End: 2019-11-28 | Stop reason: SDUPTHER

## 2019-09-19 ENCOUNTER — TELEPHONE (OUTPATIENT)
Dept: CARDIOLOGY | Facility: MEDICAL CENTER | Age: 69
End: 2019-09-19

## 2019-09-19 DIAGNOSIS — E78.00 PURE HYPERCHOLESTEROLEMIA: ICD-10-CM

## 2019-09-19 DIAGNOSIS — I10 ESSENTIAL HYPERTENSION: ICD-10-CM

## 2019-09-19 NOTE — TELEPHONE ENCOUNTER
TW pt is requesting lab orders for his 1yr FV appt on 10/15.  Preferred lab: [facility] on [facility]

## 2019-09-24 ENCOUNTER — TELEPHONE (OUTPATIENT)
Dept: CARDIOLOGY | Facility: MEDICAL CENTER | Age: 69
End: 2019-09-24

## 2019-09-24 ENCOUNTER — HOSPITAL ENCOUNTER (OUTPATIENT)
Dept: LAB | Facility: MEDICAL CENTER | Age: 69
End: 2019-09-24
Attending: INTERNAL MEDICINE
Payer: MEDICARE

## 2019-09-24 DIAGNOSIS — E78.00 PURE HYPERCHOLESTEROLEMIA: ICD-10-CM

## 2019-09-24 DIAGNOSIS — I10 ESSENTIAL HYPERTENSION: ICD-10-CM

## 2019-09-24 LAB
ANION GAP SERPL CALC-SCNC: 11 MMOL/L (ref 0–11.9)
BUN SERPL-MCNC: 15 MG/DL (ref 8–22)
CALCIUM SERPL-MCNC: 9.1 MG/DL (ref 8.5–10.5)
CHLORIDE SERPL-SCNC: 108 MMOL/L (ref 96–112)
CHOLEST SERPL-MCNC: 136 MG/DL (ref 100–199)
CO2 SERPL-SCNC: 23 MMOL/L (ref 20–33)
CREAT SERPL-MCNC: 0.99 MG/DL (ref 0.5–1.4)
GLUCOSE SERPL-MCNC: 87 MG/DL (ref 65–99)
HDLC SERPL-MCNC: 43 MG/DL
LDLC SERPL CALC-MCNC: 70 MG/DL
POTASSIUM SERPL-SCNC: 3.7 MMOL/L (ref 3.6–5.5)
SODIUM SERPL-SCNC: 142 MMOL/L (ref 135–145)
TRIGL SERPL-MCNC: 113 MG/DL (ref 0–149)

## 2019-09-24 PROCEDURE — 36415 COLL VENOUS BLD VENIPUNCTURE: CPT

## 2019-09-24 PROCEDURE — 80048 BASIC METABOLIC PNL TOTAL CA: CPT

## 2019-09-24 PROCEDURE — 80061 LIPID PANEL: CPT

## 2019-09-24 NOTE — TELEPHONE ENCOUNTER
TW    This patient would like to have a copy of his blood test results to be sent to him via his address we have on file.       Thank you,  Josselyn ROSENBERG

## 2019-10-15 ENCOUNTER — OFFICE VISIT (OUTPATIENT)
Dept: CARDIOLOGY | Facility: MEDICAL CENTER | Age: 69
End: 2019-10-15
Payer: MEDICARE

## 2019-10-15 VITALS
WEIGHT: 193.6 LBS | SYSTOLIC BLOOD PRESSURE: 138 MMHG | HEIGHT: 74 IN | DIASTOLIC BLOOD PRESSURE: 82 MMHG | OXYGEN SATURATION: 96 % | BODY MASS INDEX: 24.85 KG/M2 | HEART RATE: 72 BPM

## 2019-10-15 DIAGNOSIS — Z12.5 SCREENING PSA (PROSTATE SPECIFIC ANTIGEN): ICD-10-CM

## 2019-10-15 DIAGNOSIS — E78.2 HYPERLIPIDEMIA, MIXED: ICD-10-CM

## 2019-10-15 DIAGNOSIS — I25.10 CORONARY ARTERY DISEASE INVOLVING NATIVE CORONARY ARTERY OF NATIVE HEART WITHOUT ANGINA PECTORIS: ICD-10-CM

## 2019-10-15 PROCEDURE — 99213 OFFICE O/P EST LOW 20 MIN: CPT | Performed by: INTERNAL MEDICINE

## 2019-10-15 NOTE — PROGRESS NOTES
Subjective:   Samuel Luong is a 69 y.o. male who presents today for follow-up of CAD. Former patient of Dr. Keita. Has a history of mixed hyperlipidemia and mild hypertension and experienced a and anterolateral apical MI 2009 and received a SIMA to the proximal LAD. He has preserved left ventricular ejection fraction. Tolerating his medical therapy well. Blood pressure is under excellent control. He exercises 3 times per week, also twice a week on top of this, has no cardio vascular symptoms and is very interested in his health.    Since last visit he continues to feel well exercises in the gym vigorously 3 times per week and golfs the other 2 days.  Is absolutely no exertional symptoms.  Lipid profile is excellent blood pressure and heart rate are good.  Brings home blood pressure log that shows excellent blood pressures.      Past Medical History:   Diagnosis Date   • Acute MI, anterolateral wall (HCC)     Robert-lateral-apical MI   • CAD (coronary artery disease) 12/21/2011   • Chest pain, unspecified    • HTN (hypertension) 2/5/2013   • Hyperlipidemia, mixed    • Presence of drug coated stent in LAD coronary artery     2009 3.0 x 18mm drug eluting stent too proximal LAD     History reviewed. No pertinent surgical history.  Family History   Problem Relation Age of Onset   • Non-contributory Mother    • Non-contributory Father      Social History     Tobacco Use   Smoking Status Never Smoker   Smokeless Tobacco Never Used     Allergies   Allergen Reactions   • Penicillins    • Statins [Hmg-Coa-R Inhibitors]      myalgia     Outpatient Encounter Medications as of 10/15/2019   Medication Sig Dispense Refill   • carvedilol (COREG) 3.125 MG Tab TAKE 1 TABLET BY MOUTH TWICE DAILY WITH MEALS 180 Tab 0   • niacin SR (NIASPAN) 500 MG Tab CR TAKE 1 TABLET BY MOUTH EVERY DAY 90 Tab 1   • rosuvastatin (CRESTOR) 40 MG tablet Take 1 Tab by mouth every day. 90 Tab 3   • aspirin (ASA) 325 MG TABS Take 325 mg by mouth  "every day.     • carvedilol (COREG) 3.125 MG Tab Take 1 Tab by mouth 2 times a day, with meals. (Patient not taking: Reported on 10/15/2019) 180 Tab 3     No facility-administered encounter medications on file as of 10/15/2019.      Review of Systems   All other systems reviewed and are negative.       Objective:   /82 (BP Location: Left arm, Patient Position: Sitting, BP Cuff Size: Adult)   Pulse 72   Ht 1.88 m (6' 2\")   Wt 87.8 kg (193 lb 9.6 oz)   SpO2 96%   BMI 24.86 kg/m²     Physical Exam   Constitutional: He is oriented to person, place, and time. He appears well-developed and well-nourished. No distress.   Tall thin and athletic appearing   HENT:   Head: Normocephalic and atraumatic.   Mouth/Throat: Oropharynx is clear and moist.   Eyes: Pupils are equal, round, and reactive to light. Conjunctivae are normal. No scleral icterus.   Neck: No JVD present. No tracheal deviation present. No thyromegaly present.   Cardiovascular: Normal rate, regular rhythm, S1 normal, normal heart sounds and intact distal pulses. PMI is not displaced. Exam reveals no gallop and no friction rub.   No murmur heard.  Pulses:       Carotid pulses are 2+ on the right side, and 2+ on the left side.       Radial pulses are 2+ on the right side, and 2+ on the left side.        Dorsalis pedis pulses are 2+ on the right side, and 2+ on the left side.        Posterior tibial pulses are 2+ on the right side, and 2+ on the left side.   Pulmonary/Chest: Effort normal and breath sounds normal. He has no wheezes. He has no rales.   Abdominal: Soft. Bowel sounds are normal. He exhibits no distension and no pulsatile midline mass. There is no tenderness. There is no guarding.   Musculoskeletal: He exhibits no edema.   Neurological: He is alert and oriented to person, place, and time. No cranial nerve deficit (cranial nerves II through XII grossly intact).   Skin: Skin is warm and dry. No rash noted. He is not diaphoretic. No erythema. "   Psychiatric: He has a normal mood and affect. His behavior is normal. Thought content normal.   No changes in physical exam since 1/30/2018    LABS:  Lab Results   Component Value Date/Time    CHOLSTRLTOT 136 09/24/2019 07:20 AM    LDL 70 09/24/2019 07:20 AM    HDL 43 09/24/2019 07:20 AM    TRIGLYCERIDE 113 09/24/2019 07:20 AM       Lab Results   Component Value Date/Time    WBC 5.8 07/23/2013 06:55 AM    RBC 4.97 07/23/2013 06:55 AM    HEMOGLOBIN 15.9 07/23/2013 06:55 AM    HEMATOCRIT 48.3 07/23/2013 06:55 AM    MCV 97 07/23/2013 06:55 AM    NEUTSPOLYS 58 07/23/2013 06:55 AM    LYMPHOCYTES 30 07/23/2013 06:55 AM    MONOCYTES 9 07/23/2013 06:55 AM    EOSINOPHILS 3 07/23/2013 06:55 AM    BASOPHILS 0 07/23/2013 06:55 AM     Lab Results   Component Value Date/Time    SODIUM 142 09/24/2019 07:20 AM    POTASSIUM 3.7 09/24/2019 07:20 AM    CHLORIDE 108 09/24/2019 07:20 AM    CO2 23 09/24/2019 07:20 AM    GLUCOSE 87 09/24/2019 07:20 AM    BUN 15 09/24/2019 07:20 AM    CREATININE 0.99 09/24/2019 07:20 AM    CREATININE 1.05 01/22/2013 06:39 AM    BUNCREATRAT 13 08/14/2014 06:50 AM    BUNCREATRAT 14 01/22/2013 06:39 AM         Lab Results   Component Value Date/Time    ALKPHOSPHAT 47 09/25/2018 07:48 AM    ASTSGOT 21 09/25/2018 07:48 AM    ALTSGPT 25 09/25/2018 07:48 AM    TBILIRUBIN 0.7 09/25/2018 07:48 AM      No results found for: BNPBTYPENAT   No results found for: TSH  No results found for: PROTHROMBTM, INR     EKG (5/26/2017):   shows sinus rhythm 70, LVH, anterior MI old    Assessment:     1. CAD s/p SIMA LAD 2009  Lipid Profile    Comp Metabolic Panel   2. Hyperlipidemia, mixed  Lipid Profile    Comp Metabolic Panel   3. Screening PSA (prostate specific antigen)  PROSTATE SPECIFIC AG DIAGNOSTIC    PROSTATE SPECIFIC AG DIAGNOSTIC       Medical Decision Making:  Today's Assessment / Status / Plan:     Doing very well from a cardiac perspective with an excellent exercise capacity.  We discussed stress testing for any  symptoms or should he become less active.  His current functional capacity there is very little stress test will add to his care at this time.  We discussed these things at length.  Metabolic panel lipid profile in 12 months he requests PSA be added to his labs and I do not mind.  I request that he follow-up with this with his PCP though.  Otherwise continue current medical therapy and follow-up in 1 year.

## 2019-11-28 DIAGNOSIS — I10 ESSENTIAL HYPERTENSION: ICD-10-CM

## 2019-11-28 DIAGNOSIS — I21.09: ICD-10-CM

## 2019-12-02 RX ORDER — CARVEDILOL 3.12 MG/1
TABLET ORAL
Qty: 180 TAB | Refills: 3 | Status: SHIPPED | OUTPATIENT
Start: 2019-12-02 | End: 2020-12-07

## 2020-02-26 DIAGNOSIS — E78.00 PURE HYPERCHOLESTEROLEMIA: ICD-10-CM

## 2020-02-28 RX ORDER — NIACIN 500 MG/1
TABLET, EXTENDED RELEASE ORAL
Qty: 90 TAB | Refills: 2 | Status: SHIPPED | OUTPATIENT
Start: 2020-02-28 | End: 2021-05-21

## 2020-05-27 DIAGNOSIS — E78.2 MIXED HYPERLIPIDEMIA: ICD-10-CM

## 2020-05-27 DIAGNOSIS — E78.2 HYPERLIPIDEMIA, MIXED: ICD-10-CM

## 2020-06-03 RX ORDER — ROSUVASTATIN CALCIUM 40 MG/1
TABLET, COATED ORAL
Qty: 90 TAB | Refills: 2 | Status: SHIPPED | OUTPATIENT
Start: 2020-06-03 | End: 2021-03-26 | Stop reason: SDUPTHER

## 2020-10-26 ENCOUNTER — TELEPHONE (OUTPATIENT)
Dept: CARDIOLOGY | Facility: MEDICAL CENTER | Age: 70
End: 2020-10-26

## 2020-10-26 NOTE — TELEPHONE ENCOUNTER
Contacted patient and talked to the wife regarding his labs. He did finish them through [facility], going to send over a records request for his labs.     Patient will also bring in the labs on the day of the appointment.

## 2020-11-10 ENCOUNTER — OFFICE VISIT (OUTPATIENT)
Dept: CARDIOLOGY | Facility: MEDICAL CENTER | Age: 70
End: 2020-11-10
Payer: MEDICARE

## 2020-11-10 VITALS
BODY MASS INDEX: 24.92 KG/M2 | DIASTOLIC BLOOD PRESSURE: 80 MMHG | WEIGHT: 194.2 LBS | HEIGHT: 74 IN | OXYGEN SATURATION: 97 % | HEART RATE: 79 BPM | RESPIRATION RATE: 16 BRPM | SYSTOLIC BLOOD PRESSURE: 126 MMHG

## 2020-11-10 DIAGNOSIS — I10 ESSENTIAL HYPERTENSION: ICD-10-CM

## 2020-11-10 DIAGNOSIS — E78.2 HYPERLIPIDEMIA, MIXED: ICD-10-CM

## 2020-11-10 DIAGNOSIS — I25.10 CORONARY ARTERY DISEASE INVOLVING NATIVE CORONARY ARTERY OF NATIVE HEART WITHOUT ANGINA PECTORIS: ICD-10-CM

## 2020-11-10 PROCEDURE — 99213 OFFICE O/P EST LOW 20 MIN: CPT | Performed by: INTERNAL MEDICINE

## 2020-11-10 RX ORDER — CIPROFLOXACIN AND DEXAMETHASONE 3; 1 MG/ML; MG/ML
SUSPENSION/ DROPS AURICULAR (OTIC)
COMMUNITY
Start: 2020-10-06

## 2020-11-10 RX ORDER — EZETIMIBE 10 MG/1
10 TABLET ORAL DAILY
Qty: 90 TAB | Refills: 3 | Status: SHIPPED | OUTPATIENT
Start: 2020-11-10 | End: 2021-04-30 | Stop reason: SDUPTHER

## 2020-11-10 NOTE — PROGRESS NOTES
Subjective:   Samuel Luong is a 70y.o. male who presents today for follow-up of CAD. Former patient of Dr. Keita. Has a history of mixed hyperlipidemia and mild hypertension and experienced a and anterolateral apical MI 2009 and received a SIMA to the proximal LAD. He has preserved left ventricular ejection fraction. Tolerating his medical therapy well. Blood pressure is under excellent control. He exercises 3 times per week, also twice a week on top of this, has no cardio vascular symptoms and is very interested in his health.    Since last visit he continues to exercise vigorously 3 times a week and golf 2 times per week with no exertional symptoms getting his heart rate up to 85% of his maximum predicted each exercise session.  LDL is 82 which is up from prior.      Past Medical History:   Diagnosis Date   • Acute MI, anterolateral wall (HCC)     Robert-lateral-apical MI   • CAD (coronary artery disease) 12/21/2011   • Chest pain, unspecified    • HTN (hypertension) 2/5/2013   • Hyperlipidemia, mixed    • Presence of drug coated stent in LAD coronary artery     2009 3.0 x 18mm drug eluting stent too proximal LAD     History reviewed. No pertinent surgical history.  Family History   Problem Relation Age of Onset   • Non-contributory Mother    • Non-contributory Father      Social History     Tobacco Use   Smoking Status Never Smoker   Smokeless Tobacco Never Used     Allergies   Allergen Reactions   • Penicillins    • Statins [Hmg-Coa-R Inhibitors]      myalgia     Outpatient Encounter Medications as of 11/10/2020   Medication Sig Dispense Refill   • ezetimibe (ZETIA) 10 MG Tab Take 1 Tab by mouth every day. 90 Tab 3   • rosuvastatin (CRESTOR) 40 MG tablet TAKE 1 TABLET BY MOUTH EVERY DAY 90 Tab 2   • niacin SR (NIASPAN) 500 MG Tab CR TAKE 1 TABLET BY MOUTH EVERY DAY (Patient taking differently: Takes half each day) 90 Tab 2   • carvedilol (COREG) 3.125 MG Tab TAKE 1 TABLET BY MOUTH TWICE DAILY WITH MEALS  "180 Tab 3   • aspirin (ASA) 325 MG TABS Take 325 mg by mouth every day.     • ciprofloxacin/dexamethasone (CIPRODEX) 0.3-0.1 % Suspension UTD       No facility-administered encounter medications on file as of 11/10/2020.      Review of Systems   All other systems reviewed and are negative.       Objective:   /80 (BP Location: Left arm, Patient Position: Sitting, BP Cuff Size: Adult)   Pulse 79   Resp 16   Ht 1.88 m (6' 2\")   Wt 88.1 kg (194 lb 3.2 oz)   SpO2 97%   BMI 24.93 kg/m²     Physical Exam   Constitutional: He is oriented to person, place, and time. He appears well-developed and well-nourished. No distress.   Tall thin and athletic appearing   HENT:   Head: Normocephalic and atraumatic.   Mouth/Throat: Oropharynx is clear and moist.   Eyes: Pupils are equal, round, and reactive to light. Conjunctivae are normal. No scleral icterus.   Neck: No JVD present. No tracheal deviation present. No thyromegaly present.   Cardiovascular: Normal rate, regular rhythm, S1 normal, normal heart sounds and intact distal pulses. PMI is not displaced. Exam reveals no gallop and no friction rub.   No murmur heard.  Pulses:       Carotid pulses are 2+ on the right side and 2+ on the left side.       Radial pulses are 2+ on the right side and 2+ on the left side.        Dorsalis pedis pulses are 2+ on the right side and 2+ on the left side.        Posterior tibial pulses are 2+ on the right side and 2+ on the left side.   Pulmonary/Chest: Effort normal and breath sounds normal. He has no wheezes. He has no rales.   Abdominal: Soft. Bowel sounds are normal. He exhibits no distension and no pulsatile midline mass. There is no abdominal tenderness. There is no guarding.   Musculoskeletal:         General: No edema.   Neurological: He is alert and oriented to person, place, and time. No cranial nerve deficit (cranial nerves II through XII grossly intact).   Skin: Skin is warm and dry. No rash noted. He is not diaphoretic. " No erythema.   Psychiatric: He has a normal mood and affect. His behavior is normal. Thought content normal.       LABS:  Lab Results   Component Value Date/Time    CHOLSTRLTOT 136 09/24/2019 07:20 AM    LDL 70 09/24/2019 07:20 AM    HDL 43 09/24/2019 07:20 AM    TRIGLYCERIDE 113 09/24/2019 07:20 AM       Lab Results   Component Value Date/Time    WBC 5.8 07/23/2013 06:55 AM    RBC 4.97 07/23/2013 06:55 AM    HEMOGLOBIN 15.9 07/23/2013 06:55 AM    HEMATOCRIT 48.3 07/23/2013 06:55 AM    MCV 97 07/23/2013 06:55 AM    NEUTSPOLYS 58 07/23/2013 06:55 AM    LYMPHOCYTES 30 07/23/2013 06:55 AM    MONOCYTES 9 07/23/2013 06:55 AM    EOSINOPHILS 3 07/23/2013 06:55 AM    BASOPHILS 0 07/23/2013 06:55 AM     Lab Results   Component Value Date/Time    SODIUM 142 09/24/2019 07:20 AM    POTASSIUM 3.7 09/24/2019 07:20 AM    CHLORIDE 108 09/24/2019 07:20 AM    CO2 23 09/24/2019 07:20 AM    GLUCOSE 87 09/24/2019 07:20 AM    BUN 15 09/24/2019 07:20 AM    CREATININE 0.99 09/24/2019 07:20 AM    CREATININE 1.05 01/22/2013 06:39 AM    BUNCREATRAT 13 08/14/2014 06:50 AM    BUNCREATRAT 14 01/22/2013 06:39 AM         Lab Results   Component Value Date/Time    ALKPHOSPHAT 47 09/25/2018 07:48 AM    ASTSGOT 21 09/25/2018 07:48 AM    ALTSGPT 25 09/25/2018 07:48 AM    TBILIRUBIN 0.7 09/25/2018 07:48 AM      No results found for: BNPBTYPENAT   No results found for: TSH  No results found for: PROTHROMBTM, INR     EKG (5/26/2017):   shows sinus rhythm 70, LVH, anterior MI old    Labs (10/2/2020): CMP unremarkable aside from glucose 102, , , HDL 40, LDL 82    Assessment:     1. CAD s/p SIMA LAD 2009     2. Hyperlipidemia, mixed  ezetimibe (ZETIA) 10 MG Tab   3. Essential hypertension         Medical Decision Making:  Today's Assessment / Status / Plan:     Doing very well excellent exercise capacity.  No symptoms.  We discussed stress testing should his functional capacity drop or he experience any symptoms.  We also discussed his LDL goal  of below 70.  Recommend addition of Zetia to his maximum dose Crestor.  We also discussed diet and lifestyle.    1.  Zetia 10 mg daily  2.  Continue Crestor and other cardiac medical therapy  3.  Continue exercise and lifestyle  4.  Stress testing if the above occurs    Follow-up with cardiology yearly

## 2020-12-06 DIAGNOSIS — I10 ESSENTIAL HYPERTENSION: ICD-10-CM

## 2020-12-06 DIAGNOSIS — I21.09: ICD-10-CM

## 2020-12-07 RX ORDER — CARVEDILOL 3.12 MG/1
TABLET ORAL
Qty: 180 TAB | Refills: 3 | Status: SHIPPED | OUTPATIENT
Start: 2020-12-07 | End: 2021-11-18

## 2021-01-15 DIAGNOSIS — Z23 NEED FOR VACCINATION: ICD-10-CM

## 2021-03-26 ENCOUNTER — TELEPHONE (OUTPATIENT)
Dept: CARDIOLOGY | Facility: MEDICAL CENTER | Age: 71
End: 2021-03-26

## 2021-03-26 DIAGNOSIS — E78.2 MIXED HYPERLIPIDEMIA: ICD-10-CM

## 2021-03-26 DIAGNOSIS — E78.2 HYPERLIPIDEMIA, MIXED: ICD-10-CM

## 2021-03-26 RX ORDER — ROSUVASTATIN CALCIUM 40 MG/1
40 TABLET, COATED ORAL
Qty: 90 TABLET | Refills: 2 | Status: SHIPPED | OUTPATIENT
Start: 2021-03-26 | End: 2021-06-14

## 2021-03-26 NOTE — TELEPHONE ENCOUNTER
TW    Pt called needing a new 90 day prescription of rosuvastatin sent to [facility] pharmacy on [address] [facility]. Pt is out of medication.     Thank you

## 2021-03-26 NOTE — TELEPHONE ENCOUNTER
Prescription renewed and sent to [facility] pharmacy.

## 2021-04-30 ENCOUNTER — TELEPHONE (OUTPATIENT)
Dept: CARDIOLOGY | Facility: MEDICAL CENTER | Age: 71
End: 2021-04-30

## 2021-04-30 DIAGNOSIS — E78.2 HYPERLIPIDEMIA, MIXED: ICD-10-CM

## 2021-04-30 RX ORDER — EZETIMIBE 10 MG/1
10 TABLET ORAL DAILY
Qty: 90 TABLET | Refills: 3 | Status: SHIPPED | OUTPATIENT
Start: 2021-04-30 | End: 2022-05-13

## 2021-04-30 NOTE — TELEPHONE ENCOUNTER
TW    Pt called needing a refill of ezetimibe (ZETIA) 10 MG Tab sent to [facility] pharmacy on [address]. Pt has enough medication to get through the weekend, but will then be out.    Thank you

## 2021-05-21 DIAGNOSIS — E78.00 PURE HYPERCHOLESTEROLEMIA: ICD-10-CM

## 2021-05-26 ENCOUNTER — PATIENT MESSAGE (OUTPATIENT)
Dept: CARDIOLOGY | Facility: MEDICAL CENTER | Age: 71
End: 2021-05-26

## 2021-05-26 ENCOUNTER — TELEPHONE (OUTPATIENT)
Dept: CARDIOLOGY | Facility: MEDICAL CENTER | Age: 71
End: 2021-05-26

## 2021-05-26 DIAGNOSIS — E78.00 PURE HYPERCHOLESTEROLEMIA: ICD-10-CM

## 2021-05-26 NOTE — TELEPHONE ENCOUNTER
Samuel Luong 26 minutes ago (1:27 PM)   PF  Dr Perez made the change last year, I think.  I ahve been cutting the 500mg tablets in half since he approved the change.  ---------------------------------------------------------  No notes in Epic regarding this change.    To Dr. Phillips to advise

## 2021-05-26 NOTE — TELEPHONE ENCOUNTER
TW    Patient called and needs a refill of his Niacin. He also states they should be 250 mg. Please send to pharmacy on file.    Thank you,   Altagracia OATES

## 2021-05-27 RX ORDER — NIACIN 500 MG/1
TABLET, EXTENDED RELEASE ORAL
Qty: 90 TABLET | Refills: 3 | Status: SHIPPED | OUTPATIENT
Start: 2021-05-27 | End: 2021-05-28 | Stop reason: SDUPTHER

## 2021-05-28 DIAGNOSIS — E78.00 PURE HYPERCHOLESTEROLEMIA: ICD-10-CM

## 2021-05-28 RX ORDER — NIACIN 500 MG/1
250 TABLET, EXTENDED RELEASE ORAL
Qty: 45 TABLET | Refills: 3 | Status: SHIPPED | OUTPATIENT
Start: 2021-05-28 | End: 2021-06-01

## 2021-06-01 DIAGNOSIS — E78.00 PURE HYPERCHOLESTEROLEMIA: ICD-10-CM

## 2021-06-01 RX ORDER — NIACIN 500 MG/1
TABLET, EXTENDED RELEASE ORAL
Qty: 45 TABLET | Refills: 3 | Status: SHIPPED | OUTPATIENT
Start: 2021-06-01 | End: 2021-06-04 | Stop reason: CLARIF

## 2021-06-04 DIAGNOSIS — E78.00 PURE HYPERCHOLESTEROLEMIA: ICD-10-CM

## 2021-06-04 RX ORDER — NIACIN 500 MG/1
TABLET, EXTENDED RELEASE ORAL
Qty: 45 TABLET | Refills: 3 | Status: SHIPPED | OUTPATIENT
Start: 2021-06-04 | End: 2022-02-09 | Stop reason: SDUPTHER

## 2021-06-04 NOTE — TELEPHONE ENCOUNTER
Pt called back stating the Rx niacin SR (NIASPAN) was filled incorrectly. Pt is requesting Niacin SR 250mg tablets.   Pt states that there is some confusion on the dosage and pt does not want to cut the 500mg tablets in half any longer. Pt would like to have 250mg tablets only to make it easier.     Pt uses [facility] on [address] and [facility] [facility].     Please call back for clarification at [phone]      Thank you.

## 2021-06-14 DIAGNOSIS — E78.2 HYPERLIPIDEMIA, MIXED: ICD-10-CM

## 2021-06-14 DIAGNOSIS — E78.2 MIXED HYPERLIPIDEMIA: ICD-10-CM

## 2021-06-14 RX ORDER — ROSUVASTATIN CALCIUM 40 MG/1
40 TABLET, COATED ORAL DAILY
Qty: 90 TABLET | Refills: 3 | Status: SHIPPED | OUTPATIENT
Start: 2021-06-14 | End: 2022-05-13

## 2021-09-10 ENCOUNTER — TELEPHONE (OUTPATIENT)
Dept: CARDIOLOGY | Facility: MEDICAL CENTER | Age: 71
End: 2021-09-10

## 2021-09-10 NOTE — TELEPHONE ENCOUNTER
----- Message from Benja Ordonez Ass't sent at 9/10/2021  6:24 AM PDT -----  Regarding: Follow up  Hi Schedulers,    Please contact patient to schedule an appointment with their cardiologist.  Patient is to return to clinic around 11/2021 per TW last office note. No appointment scheduled.    Thank you,    Misa Yousif Ass't

## 2021-11-17 DIAGNOSIS — I10 ESSENTIAL HYPERTENSION: ICD-10-CM

## 2021-11-17 DIAGNOSIS — I21.09: ICD-10-CM

## 2021-11-18 RX ORDER — CARVEDILOL 3.12 MG/1
TABLET ORAL
Qty: 180 TABLET | Refills: 0 | Status: SHIPPED | OUTPATIENT
Start: 2021-11-18 | End: 2022-02-24 | Stop reason: SDUPTHER

## 2021-12-06 ENCOUNTER — PATIENT MESSAGE (OUTPATIENT)
Dept: CARDIOLOGY | Facility: MEDICAL CENTER | Age: 71
End: 2021-12-06

## 2021-12-06 DIAGNOSIS — E78.2 MIXED HYPERLIPIDEMIA: ICD-10-CM

## 2021-12-06 DIAGNOSIS — I25.10 CORONARY ARTERY DISEASE INVOLVING NATIVE CORONARY ARTERY OF NATIVE HEART WITHOUT ANGINA PECTORIS: ICD-10-CM

## 2021-12-06 DIAGNOSIS — I10 ESSENTIAL HYPERTENSION: ICD-10-CM

## 2021-12-06 DIAGNOSIS — E78.00 PURE HYPERCHOLESTEROLEMIA: ICD-10-CM

## 2021-12-13 ENCOUNTER — TELEPHONE (OUTPATIENT)
Dept: CARDIOLOGY | Facility: MEDICAL CENTER | Age: 71
End: 2021-12-13

## 2021-12-13 NOTE — TELEPHONE ENCOUNTER
Called patient. He has not done labs yet.he goes to [facility] for his lab draws. Has not received lab slips yet. Looks like Janae mailed on the 6th.    I printed and mailed them again. Also let him know if he goes to [facility] lab he does not need the lab slip.

## 2021-12-24 ENCOUNTER — HOSPITAL ENCOUNTER (OUTPATIENT)
Dept: LAB | Facility: MEDICAL CENTER | Age: 71
End: 2021-12-24
Attending: INTERNAL MEDICINE
Payer: MEDICARE

## 2021-12-24 DIAGNOSIS — E78.00 PURE HYPERCHOLESTEROLEMIA: ICD-10-CM

## 2021-12-24 DIAGNOSIS — E78.2 MIXED HYPERLIPIDEMIA: ICD-10-CM

## 2021-12-24 DIAGNOSIS — I25.10 CORONARY ARTERY DISEASE INVOLVING NATIVE CORONARY ARTERY OF NATIVE HEART WITHOUT ANGINA PECTORIS: ICD-10-CM

## 2021-12-24 DIAGNOSIS — I10 ESSENTIAL HYPERTENSION: ICD-10-CM

## 2021-12-24 LAB
ALBUMIN SERPL BCP-MCNC: 4.7 G/DL (ref 3.2–4.9)
ALBUMIN/GLOB SERPL: 2 G/DL
ALP SERPL-CCNC: 52 U/L (ref 30–99)
ALT SERPL-CCNC: 36 U/L (ref 2–50)
ANION GAP SERPL CALC-SCNC: 10 MMOL/L (ref 7–16)
AST SERPL-CCNC: 23 U/L (ref 12–45)
BILIRUB SERPL-MCNC: 0.9 MG/DL (ref 0.1–1.5)
BUN SERPL-MCNC: 13 MG/DL (ref 8–22)
CALCIUM SERPL-MCNC: 9.4 MG/DL (ref 8.5–10.5)
CHLORIDE SERPL-SCNC: 108 MMOL/L (ref 96–112)
CHOLEST SERPL-MCNC: 113 MG/DL (ref 100–199)
CO2 SERPL-SCNC: 25 MMOL/L (ref 20–33)
CREAT SERPL-MCNC: 0.95 MG/DL (ref 0.5–1.4)
FASTING STATUS PATIENT QL REPORTED: NORMAL
GLOBULIN SER CALC-MCNC: 2.3 G/DL (ref 1.9–3.5)
GLUCOSE SERPL-MCNC: 106 MG/DL (ref 65–99)
HDLC SERPL-MCNC: 43 MG/DL
LDLC SERPL CALC-MCNC: 54 MG/DL
POTASSIUM SERPL-SCNC: 4 MMOL/L (ref 3.6–5.5)
PROT SERPL-MCNC: 7 G/DL (ref 6–8.2)
SODIUM SERPL-SCNC: 143 MMOL/L (ref 135–145)
TRIGL SERPL-MCNC: 79 MG/DL (ref 0–149)

## 2021-12-24 PROCEDURE — 80061 LIPID PANEL: CPT

## 2021-12-24 PROCEDURE — 80053 COMPREHEN METABOLIC PANEL: CPT

## 2021-12-24 PROCEDURE — 36415 COLL VENOUS BLD VENIPUNCTURE: CPT

## 2022-02-09 ENCOUNTER — OFFICE VISIT (OUTPATIENT)
Dept: CARDIOLOGY | Facility: MEDICAL CENTER | Age: 72
End: 2022-02-09
Payer: MEDICARE

## 2022-02-09 VITALS
DIASTOLIC BLOOD PRESSURE: 88 MMHG | SYSTOLIC BLOOD PRESSURE: 132 MMHG | HEIGHT: 75 IN | BODY MASS INDEX: 23.87 KG/M2 | OXYGEN SATURATION: 97 % | RESPIRATION RATE: 14 BRPM | HEART RATE: 74 BPM | WEIGHT: 192 LBS

## 2022-02-09 DIAGNOSIS — I25.10 CORONARY ARTERY DISEASE INVOLVING NATIVE CORONARY ARTERY OF NATIVE HEART WITHOUT ANGINA PECTORIS: ICD-10-CM

## 2022-02-09 DIAGNOSIS — E78.2 MIXED HYPERLIPIDEMIA: ICD-10-CM

## 2022-02-09 DIAGNOSIS — E78.00 PURE HYPERCHOLESTEROLEMIA: ICD-10-CM

## 2022-02-09 DIAGNOSIS — I10 ESSENTIAL HYPERTENSION: ICD-10-CM

## 2022-02-09 PROCEDURE — 99213 OFFICE O/P EST LOW 20 MIN: CPT | Performed by: INTERNAL MEDICINE

## 2022-02-09 RX ORDER — NIACIN 500 MG/1
TABLET, EXTENDED RELEASE ORAL
Qty: 45 TABLET | Refills: 3 | Status: SHIPPED | OUTPATIENT
Start: 2022-02-09 | End: 2022-11-17 | Stop reason: SDUPTHER

## 2022-02-09 NOTE — PROGRESS NOTES
Subjective:   Samuel Luong is a 71 y.o. male who presents today for follow-up of CAD. Former patient of Dr. Keita. Has a history of mixed hyperlipidemia and mild hypertension and experienced a and anterolateral apical MI 2009 and received a SIMA to the proximal LAD. He has preserved left ventricular ejection fraction.     Continues to exercise vigorously and golf with no symptoms and has improved his cough came to under 80.  Takes his medications as directed and his lipid profile is now excellent with the addition of Zetia.      Past Medical History:   Diagnosis Date   • Acute MI, anterolateral wall (HCC)     Robert-lateral-apical MI   • CAD (coronary artery disease) 12/21/2011   • Chest pain, unspecified    • HTN (hypertension) 2/5/2013   • Hyperlipidemia, mixed    • Presence of drug coated stent in LAD coronary artery     2009 3.0 x 18mm drug eluting stent too proximal LAD     History reviewed. No pertinent surgical history.  Family History   Problem Relation Age of Onset   • Non-contributory Mother    • Non-contributory Father      Social History     Tobacco Use   Smoking Status Never Smoker   Smokeless Tobacco Never Used     Allergies   Allergen Reactions   • Penicillins    • Statins [Hmg-Coa-R Inhibitors]      myalgia     Outpatient Encounter Medications as of 2/9/2022   Medication Sig Dispense Refill   • niacin SR (NIASPAN) 500 MG Tab CR Take 1/2 tablet daily 45 Tablet 3   • carvedilol (COREG) 3.125 MG Tab TAKE 1 TABLET BY MOUTH TWICE DAILY WITH MEALS 180 Tablet 0   • rosuvastatin (CRESTOR) 40 MG tablet Take 1 tablet by mouth every day. BRAND NAME ONLY / PATIENT 90 tablet 3   • ezetimibe (ZETIA) 10 MG Tab Take 1 tablet by mouth every day. 90 tablet 3   • ciprofloxacin/dexamethasone (CIPRODEX) 0.3-0.1 % Suspension UTD     • aspirin (ASA) 325 MG TABS Take 325 mg by mouth every day.     • [DISCONTINUED] niacin SR (NIASPAN) 500 MG Tab CR Take 1/2 tablet daily 45 tablet 3     No facility-administered  "encounter medications on file as of 2/9/2022.     Review of Systems   All other systems reviewed and are negative.       Objective:   /88 (BP Location: Left arm, Patient Position: Sitting, BP Cuff Size: Adult)   Pulse 74   Resp 14   Ht 1.892 m (6' 2.5\")   Wt 87.1 kg (192 lb)   SpO2 97%   BMI 24.32 kg/m²     Physical Exam  Constitutional:       General: He is not in acute distress.     Appearance: He is well-developed. He is not diaphoretic.      Comments: Tall thin and athletic appearing   HENT:      Head: Normocephalic and atraumatic.   Eyes:      General: No scleral icterus.     Conjunctiva/sclera: Conjunctivae normal.      Pupils: Pupils are equal, round, and reactive to light.   Neck:      Thyroid: No thyromegaly.      Vascular: No JVD.      Trachea: No tracheal deviation.   Cardiovascular:      Rate and Rhythm: Normal rate and regular rhythm.      Chest Wall: PMI is not displaced.      Pulses:           Carotid pulses are 2+ on the right side and 2+ on the left side.       Radial pulses are 2+ on the right side and 2+ on the left side.        Dorsalis pedis pulses are 2+ on the right side and 2+ on the left side.        Posterior tibial pulses are 2+ on the right side and 2+ on the left side.      Heart sounds: Normal heart sounds and S1 normal. No murmur heard.  No friction rub. No gallop.    Pulmonary:      Effort: Pulmonary effort is normal.      Breath sounds: Normal breath sounds. No wheezing or rales.   Abdominal:      General: Bowel sounds are normal. There is no distension.      Palpations: Abdomen is soft. There is no pulsatile mass.      Tenderness: There is no abdominal tenderness. There is no guarding.   Skin:     General: Skin is warm and dry.      Findings: No erythema or rash.   Neurological:      Mental Status: He is alert and oriented to person, place, and time.      Cranial Nerves: No cranial nerve deficit (cranial nerves II through XII grossly intact).   Psychiatric:         " Behavior: Behavior normal.         Thought Content: Thought content normal.         LABS:  Lab Results   Component Value Date/Time    CHOLSTRLTOT 113 12/24/2021 09:33 AM    LDL 54 12/24/2021 09:33 AM    HDL 43 12/24/2021 09:33 AM    TRIGLYCERIDE 79 12/24/2021 09:33 AM       Lab Results   Component Value Date/Time    WBC 5.8 07/23/2013 06:55 AM    RBC 4.97 07/23/2013 06:55 AM    HEMOGLOBIN 15.9 07/23/2013 06:55 AM    HEMATOCRIT 48.3 07/23/2013 06:55 AM    MCV 97 07/23/2013 06:55 AM    NEUTSPOLYS 58 07/23/2013 06:55 AM    LYMPHOCYTES 30 07/23/2013 06:55 AM    MONOCYTES 9 07/23/2013 06:55 AM    EOSINOPHILS 3 07/23/2013 06:55 AM    BASOPHILS 0 07/23/2013 06:55 AM     Lab Results   Component Value Date/Time    SODIUM 143 12/24/2021 09:33 AM    POTASSIUM 4.0 12/24/2021 09:33 AM    CHLORIDE 108 12/24/2021 09:33 AM    CO2 25 12/24/2021 09:33 AM    GLUCOSE 106 (H) 12/24/2021 09:33 AM    BUN 13 12/24/2021 09:33 AM    CREATININE 0.95 12/24/2021 09:33 AM    CREATININE 1.05 01/22/2013 06:39 AM    BUNCREATRAT 13 08/14/2014 06:50 AM    BUNCREATRAT 14 01/22/2013 06:39 AM         Lab Results   Component Value Date/Time    ALKPHOSPHAT 52 12/24/2021 09:33 AM    ASTSGOT 23 12/24/2021 09:33 AM    ALTSGPT 36 12/24/2021 09:33 AM    TBILIRUBIN 0.9 12/24/2021 09:33 AM      No results found for: BNPBTYPENAT   No results found for: TSH  No results found for: PROTHROMBTM, INR     EKG (5/26/2017):   shows sinus rhythm 70, LVH, anterior MI old    Labs (10/2/2020): CMP unremarkable aside from glucose 102, , , HDL 40, LDL 82    Assessment:     1. Coronary artery disease involving native coronary artery of native heart without angina pectoris     2. Pure hypercholesterolemia  niacin SR (NIASPAN) 500 MG Tab CR   3. Mixed hyperlipidemia     4. Essential hypertension         Medical Decision Making:  Today's Assessment / Status / Plan:     Doing very well excellent exercise capacity.  No symptoms.  We discussed stress testing should his  functional capacity drop or he experience any symptoms.  LDL is now at goal.    Recommend continuing current medical therapy continue to be active as tolerated and make healthful lifestyle choices.  Follow-up with cardiology yearly.

## 2022-02-23 ENCOUNTER — TELEPHONE (OUTPATIENT)
Dept: CARDIOLOGY | Facility: MEDICAL CENTER | Age: 72
End: 2022-02-23
Payer: MEDICARE

## 2022-02-23 DIAGNOSIS — I10 ESSENTIAL HYPERTENSION: ICD-10-CM

## 2022-02-23 DIAGNOSIS — I21.09: ICD-10-CM

## 2022-02-23 NOTE — TELEPHONE ENCOUNTER
TW    Patient called to advise they have about a week left on their carvedilol (COREG) 3.125 MG Tab. They would like it sent to the pharmacy on file.     Thank you,  Josselyn ROSENBERG

## 2022-02-24 RX ORDER — CARVEDILOL 3.12 MG/1
3.12 TABLET ORAL 2 TIMES DAILY WITH MEALS
Qty: 180 TABLET | Refills: 3 | Status: SHIPPED | OUTPATIENT
Start: 2022-02-24 | End: 2023-02-10 | Stop reason: SDUPTHER

## 2022-02-24 NOTE — TELEPHONE ENCOUNTER
RX renewed and sent to [facility] pharmacy on file.

## 2022-10-31 DIAGNOSIS — I10 ESSENTIAL HYPERTENSION: ICD-10-CM

## 2022-10-31 DIAGNOSIS — E78.2 MIXED HYPERLIPIDEMIA: ICD-10-CM

## 2022-10-31 DIAGNOSIS — E78.00 PURE HYPERCHOLESTEROLEMIA: ICD-10-CM

## 2022-10-31 DIAGNOSIS — I21.09: ICD-10-CM

## 2022-10-31 DIAGNOSIS — E78.2 HYPERLIPIDEMIA, MIXED: ICD-10-CM

## 2022-10-31 NOTE — TELEPHONE ENCOUNTER
Is the patient due for a refill? No    Was the patient seen the past year? Yes    Date of last office visit: 2/9/2022    Does the patient have an upcoming appointment?  No   If yes, When?     Provider to refill: CW    Does the patients insurance require a 100 day supply?  No

## 2022-11-07 ENCOUNTER — TELEPHONE (OUTPATIENT)
Dept: CARDIOLOGY | Facility: MEDICAL CENTER | Age: 72
End: 2022-11-07
Payer: MEDICARE

## 2022-11-07 DIAGNOSIS — E78.2 MIXED HYPERLIPIDEMIA: ICD-10-CM

## 2022-11-07 DIAGNOSIS — I10 ESSENTIAL HYPERTENSION: ICD-10-CM

## 2022-11-07 RX ORDER — EZETIMIBE 10 MG/1
10 TABLET ORAL DAILY
Qty: 90 TABLET | Refills: 3 | OUTPATIENT
Start: 2022-11-07

## 2022-11-07 RX ORDER — ROSUVASTATIN CALCIUM 40 MG/1
40 TABLET, COATED ORAL
Qty: 90 TABLET | Refills: 3 | OUTPATIENT
Start: 2022-11-07

## 2022-11-07 RX ORDER — NIACIN 500 MG/1
TABLET, EXTENDED RELEASE ORAL
Qty: 45 TABLET | Refills: 3 | OUTPATIENT
Start: 2022-11-07 | End: 2023-10-31

## 2022-11-07 RX ORDER — CARVEDILOL 3.12 MG/1
3.12 TABLET ORAL 2 TIMES DAILY WITH MEALS
Qty: 180 TABLET | Refills: 3 | OUTPATIENT
Start: 2022-11-07

## 2022-11-07 NOTE — TELEPHONE ENCOUNTER
HOWIE    Caller:Sherif Baker    Where labs will be completed: [facility]  Requesting PSA also    Fax/Phone number for lab:  Ernestine Garza    Upcoming Appointment Date:2/7/22    Callback Number: [phone]    Thank you,   Olivia CHAVEZ

## 2022-11-08 ENCOUNTER — PATIENT MESSAGE (OUTPATIENT)
Dept: HEALTH INFORMATION MANAGEMENT | Facility: OTHER | Age: 72
End: 2022-11-08

## 2022-11-09 ENCOUNTER — PATIENT MESSAGE (OUTPATIENT)
Dept: CARDIOLOGY | Facility: MEDICAL CENTER | Age: 72
End: 2022-11-09
Payer: MEDICARE

## 2022-11-09 NOTE — TELEPHONE ENCOUNTER
Labs ordered, lab slips printed, and mailed to pt mailing address.    MyChart sent regarding findings.

## 2023-01-31 DIAGNOSIS — E78.2 MIXED HYPERLIPIDEMIA: ICD-10-CM

## 2023-01-31 RX ORDER — ROSUVASTATIN CALCIUM 40 MG/1
40 TABLET, COATED ORAL
Qty: 90 TABLET | Refills: 0 | Status: SHIPPED | OUTPATIENT
Start: 2023-01-31 | End: 2023-05-01 | Stop reason: SDUPTHER

## 2023-01-31 NOTE — TELEPHONE ENCOUNTER
Is the patient due for a refill? Yes    Was the patient seen the past year? Yes    Date of last office visit: 2/9/22    Does the patient have an upcoming appointment?  Yes   If yes, When? 2/7/23    Provider to refill:TW2    Does the patients insurance require a 100 day supply?  No

## 2023-02-01 ENCOUNTER — PATIENT MESSAGE (OUTPATIENT)
Dept: CARDIOLOGY | Facility: MEDICAL CENTER | Age: 73
End: 2023-02-01
Payer: MEDICARE

## 2023-02-01 DIAGNOSIS — E78.00 PURE HYPERCHOLESTEROLEMIA: ICD-10-CM

## 2023-02-03 RX ORDER — NIACIN 500 MG/1
TABLET, EXTENDED RELEASE ORAL
Qty: 45 TABLET | Refills: 0 | Status: SHIPPED | OUTPATIENT
Start: 2023-02-03 | End: 2023-05-08 | Stop reason: SDUPTHER

## 2023-02-07 ENCOUNTER — OFFICE VISIT (OUTPATIENT)
Dept: CARDIOLOGY | Facility: MEDICAL CENTER | Age: 73
End: 2023-02-07
Payer: MEDICARE

## 2023-02-07 VITALS
OXYGEN SATURATION: 96 % | SYSTOLIC BLOOD PRESSURE: 150 MMHG | DIASTOLIC BLOOD PRESSURE: 84 MMHG | RESPIRATION RATE: 16 BRPM | HEIGHT: 75 IN | HEART RATE: 76 BPM | WEIGHT: 187 LBS | BODY MASS INDEX: 23.25 KG/M2

## 2023-02-07 DIAGNOSIS — I25.10 CORONARY ARTERY DISEASE INVOLVING NATIVE CORONARY ARTERY OF NATIVE HEART WITHOUT ANGINA PECTORIS: ICD-10-CM

## 2023-02-07 DIAGNOSIS — I10 ESSENTIAL HYPERTENSION: ICD-10-CM

## 2023-02-07 DIAGNOSIS — E78.2 MIXED HYPERLIPIDEMIA: ICD-10-CM

## 2023-02-07 PROCEDURE — 99213 OFFICE O/P EST LOW 20 MIN: CPT | Performed by: INTERNAL MEDICINE

## 2023-02-07 NOTE — PROGRESS NOTES
Subjective:   Samuel Luong is a 72 y.o. male who presents today for follow-up of CAD.  Has a history of mixed hyperlipidemia and mild hypertension and experienced anterolateral apical MI 2009 and received a SIMA to the proximal LAD. He has preserved left ventricular ejection fraction.     Since last visit doing well no complaints remains very active.    Past Medical History:   Diagnosis Date    Acute MI, anterolateral wall (HCC)     Robert-lateral-apical MI    CAD (coronary artery disease) 12/21/2011    Chest pain, unspecified     HTN (hypertension) 2/5/2013    Hyperlipidemia, mixed     Presence of drug coated stent in LAD coronary artery     2009 3.0 x 18mm drug eluting stent too proximal LAD     History reviewed. No pertinent surgical history.  Family History   Problem Relation Age of Onset    Non-contributory Mother     Non-contributory Father      Social History     Tobacco Use   Smoking Status Never   Smokeless Tobacco Never     Allergies   Allergen Reactions    Penicillins     Statins [Hmg-Coa-R Inhibitors]      myalgia     Outpatient Encounter Medications as of 2/7/2023   Medication Sig Dispense Refill    niacin SR (NIASPAN) 500 MG Tab CR Take 1/2 tablet daily. Patient needs to comply with follow up visit for further refills! 45 Tablet 0    rosuvastatin (CRESTOR) 40 MG tablet Take 1 Tablet by mouth every day. Please keep follow up appointment for further refills. Thank you! 90 Tablet 0    ezetimibe (ZETIA) 10 MG Tab TAKE 1 TABLET BY MOUTH EVERY DAY 90 Tablet 2    carvedilol (COREG) 3.125 MG Tab Take 1 Tablet by mouth 2 times a day with meals. 180 Tablet 3    ciprofloxacin/dexamethasone (CIPRODEX) 0.3-0.1 % Suspension UTD      aspirin (ASA) 325 MG TABS Take 325 mg by mouth every day.      triazolam (HALCION) 0.25 MG Tab TAKE 1 TABLET BY MOUTH 1 HOUR BEFORE DENTAL APPOINTMENT (Patient not taking: Reported on 2/7/2023)       No facility-administered encounter medications on file as of 2/7/2023.     Review  "of Systems   All other systems reviewed and are negative.     Objective:   BP (!) 150/84 (BP Location: Left arm, Patient Position: Sitting)   Pulse 76   Resp 16   Ht 1.892 m (6' 2.5\")   Wt 84.8 kg (187 lb)   SpO2 96%   BMI 23.69 kg/m²     Physical Exam  Constitutional:       General: He is not in acute distress.     Appearance: He is well-developed. He is not diaphoretic.      Comments: Tall thin and athletic appearing   HENT:      Head: Normocephalic and atraumatic.   Eyes:      General: No scleral icterus.     Conjunctiva/sclera: Conjunctivae normal.      Pupils: Pupils are equal, round, and reactive to light.   Neck:      Thyroid: No thyromegaly.      Vascular: No JVD.      Trachea: No tracheal deviation.   Cardiovascular:      Rate and Rhythm: Normal rate and regular rhythm.      Chest Wall: PMI is not displaced.      Pulses:           Carotid pulses are 2+ on the right side and 2+ on the left side.       Radial pulses are 2+ on the right side and 2+ on the left side.        Dorsalis pedis pulses are 2+ on the right side and 2+ on the left side.        Posterior tibial pulses are 2+ on the right side and 2+ on the left side.      Heart sounds: Normal heart sounds and S1 normal. No murmur heard.    No friction rub. No gallop.   Pulmonary:      Effort: Pulmonary effort is normal.      Breath sounds: Normal breath sounds. No wheezing or rales.   Abdominal:      General: Bowel sounds are normal. There is no distension.      Palpations: Abdomen is soft. There is no pulsatile mass.      Tenderness: There is no abdominal tenderness. There is no guarding.   Skin:     General: Skin is warm and dry.      Findings: No erythema or rash.   Neurological:      Mental Status: He is alert and oriented to person, place, and time.      Cranial Nerves: No cranial nerve deficit (cranial nerves II through XII grossly intact).   Psychiatric:         Behavior: Behavior normal.         Thought Content: Thought content normal. "       LABS:  Lab Results   Component Value Date/Time    CHOLSTRLTOT 113 12/24/2021 09:33 AM    LDL 54 12/24/2021 09:33 AM    HDL 43 12/24/2021 09:33 AM    TRIGLYCERIDE 79 12/24/2021 09:33 AM       Lab Results   Component Value Date/Time    WBC 5.8 07/23/2013 06:55 AM    RBC 4.97 07/23/2013 06:55 AM    HEMOGLOBIN 15.9 07/23/2013 06:55 AM    HEMATOCRIT 48.3 07/23/2013 06:55 AM    MCV 97 07/23/2013 06:55 AM    NEUTSPOLYS 58 07/23/2013 06:55 AM    LYMPHOCYTES 30 07/23/2013 06:55 AM    MONOCYTES 9 07/23/2013 06:55 AM    EOSINOPHILS 3 07/23/2013 06:55 AM    BASOPHILS 0 07/23/2013 06:55 AM     Lab Results   Component Value Date/Time    SODIUM 143 12/24/2021 09:33 AM    POTASSIUM 4.0 12/24/2021 09:33 AM    CHLORIDE 108 12/24/2021 09:33 AM    CO2 25 12/24/2021 09:33 AM    GLUCOSE 106 (H) 12/24/2021 09:33 AM    BUN 13 12/24/2021 09:33 AM    CREATININE 0.95 12/24/2021 09:33 AM    CREATININE 1.05 01/22/2013 06:39 AM    BUNCREATRAT 13 08/14/2014 06:50 AM    BUNCREATRAT 14 01/22/2013 06:39 AM         Lab Results   Component Value Date/Time    ALKPHOSPHAT 52 12/24/2021 09:33 AM    ASTSGOT 23 12/24/2021 09:33 AM    ALTSGPT 36 12/24/2021 09:33 AM    TBILIRUBIN 0.9 12/24/2021 09:33 AM      No results found for: BNPBTYPENAT   No results found for: TSH  No results found for: PROTHROMBTM, INR       Assessment:     1. Coronary artery disease involving native coronary artery of native heart without angina pectoris        2. Essential hypertension        3. Hyperlipidemia, mixed            Medical Decision Making:  Today's Assessment / Status / Plan:     Doing well achieving goal LDL.  Discussed diet and exercise.  Offered routine stress testing defers.  Labs in media

## 2023-02-10 DIAGNOSIS — I10 ESSENTIAL HYPERTENSION: ICD-10-CM

## 2023-02-10 DIAGNOSIS — I21.09: ICD-10-CM

## 2023-02-10 NOTE — TELEPHONE ENCOUNTER
Is the patient due for a refill? Yes    Was the patient seen the past year? Yes    Date of last office visit: 2/7/2023    Does the patient have an upcoming appointment?  No    Provider to refill:TW    Does the patients insurance require a 100 day supply?  No

## 2023-02-13 RX ORDER — CARVEDILOL 3.12 MG/1
3.12 TABLET ORAL 2 TIMES DAILY WITH MEALS
Qty: 180 TABLET | Refills: 3 | Status: SHIPPED | OUTPATIENT
Start: 2023-02-13 | End: 2024-02-06 | Stop reason: SDUPTHER

## 2023-04-27 DIAGNOSIS — E78.2 HYPERLIPIDEMIA, MIXED: ICD-10-CM

## 2023-04-28 NOTE — TELEPHONE ENCOUNTER
Is the patient due for a refill? Yes    Was the patient seen the past year? Yes    Date of last office visit: 2/7/23    Does the patient have an upcoming appointment?  No      Provider to refill:TW    Does the patients insurance require a 100 day supply?  No

## 2023-05-01 DIAGNOSIS — E78.2 MIXED HYPERLIPIDEMIA: ICD-10-CM

## 2023-05-01 NOTE — TELEPHONE ENCOUNTER
Is the patient due for a refill? Yes    Was the patient seen the past year? Yes    Date of last office visit: 2/7/2023    Does the patient have an upcoming appointment?  No   If yes, When?     Provider to refill:TW    Does the patients insurance require a 100 day supply?  No

## 2023-05-02 ENCOUNTER — TELEPHONE (OUTPATIENT)
Dept: CARDIOLOGY | Facility: MEDICAL CENTER | Age: 73
End: 2023-05-02
Payer: MEDICARE

## 2023-05-02 RX ORDER — EZETIMIBE 10 MG/1
10 TABLET ORAL DAILY
Qty: 90 TABLET | Refills: 3 | Status: SHIPPED | OUTPATIENT
Start: 2023-05-02

## 2023-05-02 RX ORDER — ROSUVASTATIN CALCIUM 40 MG/1
40 TABLET, COATED ORAL
Qty: 90 TABLET | Refills: 2 | Status: SHIPPED | OUTPATIENT
Start: 2023-05-02 | End: 2024-01-29 | Stop reason: SDUPTHER

## 2023-05-02 NOTE — TELEPHONE ENCOUNTER
TW    Caller: Samuel Luong    Medication Name and Dosage:  rosuvastatin (CRESTOR) 40 MG tablet  ezetimibe (ZETIA) 10 MG Tab    Medication amount left: Rosuvastatin okay but Ezetimie patietn only has 1 left.    Preferred Pharmacy:  [id number] - [place], [place] - [address] AT [facility]   [address], [place] [address]   Phone:  [phone]  Fax:  [phone]   SHAMIKA #:  [id number]    Other questions (Topic): Ezetimibe was inquired about last week but patient or pharmacy haven't heard back and now patient is almost out.    Callback Number (Will only call for issues): [phone]    Thank you,  -Keena CAMPOS

## 2023-05-08 DIAGNOSIS — E78.00 PURE HYPERCHOLESTEROLEMIA: ICD-10-CM

## 2023-05-09 RX ORDER — NIACIN 500 MG/1
TABLET, EXTENDED RELEASE ORAL
Qty: 45 TABLET | Refills: 2 | Status: SHIPPED | OUTPATIENT
Start: 2023-05-09 | End: 2024-02-22 | Stop reason: SDUPTHER

## 2023-08-28 DIAGNOSIS — I25.10 CORONARY ARTERY DISEASE INVOLVING NATIVE CORONARY ARTERY OF NATIVE HEART WITHOUT ANGINA PECTORIS: ICD-10-CM

## 2023-11-29 ENCOUNTER — PATIENT MESSAGE (OUTPATIENT)
Dept: HEALTH INFORMATION MANAGEMENT | Facility: OTHER | Age: 73
End: 2023-11-29

## 2024-01-22 ENCOUNTER — TELEPHONE (OUTPATIENT)
Dept: CARDIOLOGY | Facility: MEDICAL CENTER | Age: 74
End: 2024-01-22
Payer: MEDICARE

## 2024-01-22 NOTE — TELEPHONE ENCOUNTER
TW    Caller: Samuel Luong     Topic/issue: Patient called and is requesting copies of his lab orders to be mailed and/or emailed over to him.     Please advise.    Callback Number: [phone]    Thank you,    Monisha GÓMEZ

## 2024-01-24 NOTE — TELEPHONE ENCOUNTER
HOWIE        Caller: Samuel Luong      Topic/issue: Patient was requesting his lab orders that were mailed to him to be faxed over to [facility]    Fax# [phone]        Callback Number: [phone]    Thank you    -Parth CAMPOS

## 2024-01-29 DIAGNOSIS — E78.2 MIXED HYPERLIPIDEMIA: ICD-10-CM

## 2024-01-29 RX ORDER — ROSUVASTATIN CALCIUM 40 MG/1
40 TABLET, COATED ORAL
Qty: 90 TABLET | Refills: 2 | Status: SHIPPED | OUTPATIENT
Start: 2024-01-29

## 2024-01-29 NOTE — TELEPHONE ENCOUNTER
Received request via: Patient    Was the patient seen in the last year in this department? Yes    Does the patient have an active prescription (recently filled or refills available) for medication(s) requested?  They are needing a new script    Pharm[facility] Name: [facility]    Does the patient have [facility] Plus and need 100 day supply (blood pressure, diabetes and cholesterol meds only)? Patient does not have SCP

## 2024-02-05 ENCOUNTER — TELEPHONE (OUTPATIENT)
Dept: CARDIOLOGY | Facility: MEDICAL CENTER | Age: 74
End: 2024-02-05
Payer: MEDICARE

## 2024-02-06 DIAGNOSIS — I10 ESSENTIAL HYPERTENSION: ICD-10-CM

## 2024-02-06 DIAGNOSIS — I21.09: ICD-10-CM

## 2024-02-06 RX ORDER — CARVEDILOL 3.12 MG/1
3.12 TABLET ORAL 2 TIMES DAILY WITH MEALS
Qty: 180 TABLET | Refills: 0 | Status: SHIPPED | OUTPATIENT
Start: 2024-02-06

## 2024-02-06 NOTE — TELEPHONE ENCOUNTER
TW    Received request via: Patient    Was the patient seen in the last year in this department? Yes    Does the patient have an active prescription (recently filled or refills available) for medication(s) requested? No    Pharmacy Name:     [id number] - [place], [place] - [address] [facility] [[id number]     Does the patient have half-way Plus and need 100 day supply (blood pressure, diabetes and cholesterol meds only)? Patient does not have SCP

## 2024-02-16 NOTE — TELEPHONE ENCOUNTER
Lab results from [facility] printed and emailed as requested to [email or web address]   
Lab results from [facility] printed and emailed as requested.   
TW     Caller: Samuel Luong     Topic/issue: Patient called to be sure his lab results were received from [facility] and if could get a copy sent to him. He was asking for them to be emailed to [email or web address]     Please refax to this email, it was incorrect.     Callback Number: [phone]    Thank you,   Olivia CHAVEZ     
TW    Caller: Samuel Luong    Topic/issue: Patient called to be sure his lab results were received from [facility] and if could get a copy sent to him. He was asking for them to be emailed to [email or web address]    Please advise.    Callback Number: [phone]    Thank you,     Monisha GÓMEZ     
patient

## 2024-02-22 DIAGNOSIS — E78.00 PURE HYPERCHOLESTEROLEMIA: ICD-10-CM

## 2024-02-22 RX ORDER — NIACIN 500 MG/1
TABLET, EXTENDED RELEASE ORAL
Qty: 45 TABLET | Refills: 2 | Status: SHIPPED | OUTPATIENT
Start: 2024-02-22 | End: 2025-02-17

## 2024-02-23 NOTE — TELEPHONE ENCOUNTER
TW    Received request via: Patient    Was the patient seen in the last year in this department? Yes    Does the patient have an active prescription (recently filled or refills available) for medication(s) requested? No    Pharmacy Name:     [id number] - [place], [place] - [address] [facility] [[id number]     Does the patient have half-way Plus and need 100 day supply (blood pressure, diabetes and cholesterol meds only)? Patient does not have SCP

## 2024-02-29 ENCOUNTER — TELEPHONE (OUTPATIENT)
Dept: CARDIOLOGY | Facility: MEDICAL CENTER | Age: 74
End: 2024-02-29
Payer: MEDICARE

## 2024-02-29 NOTE — TELEPHONE ENCOUNTER
TW     Caller: Samuel Luong     Topic/issue: Patient called asking to resend his  lab results to [email or web address], he has yet to reiceve them.    Please seen enc 2/5/24       Callback Number: [phone]           Thank you  Janneth LEVY

## 2024-03-29 DIAGNOSIS — I25.10 CORONARY ARTERY DISEASE INVOLVING NATIVE CORONARY ARTERY OF NATIVE HEART WITHOUT ANGINA PECTORIS: ICD-10-CM

## 2024-04-15 ENCOUNTER — TELEPHONE (OUTPATIENT)
Dept: CARDIOLOGY | Facility: MEDICAL CENTER | Age: 74
End: 2024-04-15
Payer: MEDICARE

## 2024-04-15 DIAGNOSIS — E78.2 HYPERLIPIDEMIA, MIXED: ICD-10-CM

## 2024-04-15 DIAGNOSIS — I10 ESSENTIAL HYPERTENSION: ICD-10-CM

## 2024-04-15 NOTE — TELEPHONE ENCOUNTER
HOWIE    Caller: Samuel    Where labs will be completed:  [facility]  Also asking for the CMP lab  Please send  to his email     Fax/Phone number for lab:  [facility]     Upcoming Appointment Date: 4/22/24    Callback Number: [phone]    Thank you,   Olivia CHAVEZ

## 2024-04-15 NOTE — TELEPHONE ENCOUNTER
Received request via: Patient    Was the patient seen in the last year in this department? Yes    Does the patient have an active prescription (recently filled or refills available) for medication(s) requested? No    Pharmacy Name:  [id number] - [place], [place] - [address] [facility]     Does the patient have intermediate Plus and need 100 day supply (blood pressure, diabetes and cholesterol meds only)? Patient does not have SCP;;

## 2024-04-16 RX ORDER — EZETIMIBE 10 MG/1
10 TABLET ORAL DAILY
Qty: 90 TABLET | Refills: 3 | Status: SHIPPED | OUTPATIENT
Start: 2024-04-16

## 2024-04-16 NOTE — TELEPHONE ENCOUNTER
"TW    Caller: Samuel Luong     Topic/issue: Patient getting his blood drawn right now at [facility]. States that we did not fax over the CMP panel that was requested. Patient wants it to be faxed immediately to the number below. Patient is very upset and states that it needs to be handled right away and say \"attention: add to blood and lab - [facility]\"     Fax Number: [phone]    Thank you,  Ijeoma CAMPOS   "

## 2024-04-16 NOTE — TELEPHONE ENCOUNTER
TW    Caller: Samuel Luong    Topic/issue: Patient called because the wrong lab order was sent to [facility]. He needs to have a CMP ordered but it came over as A BMP. Patient would like a call when completed.     Please advise.    Callback Number: [phone]    Thank you,     Monisha GÓMEZ

## 2024-04-16 NOTE — TELEPHONE ENCOUNTER
Phone Number Called: [phone]     Call outcome: Left detailed message for patient. Informed to call back with any additional questions.    Advised that BMP and lipid panel are the orders placed by TW.

## 2024-04-22 ENCOUNTER — TELEPHONE (OUTPATIENT)
Dept: CARDIOLOGY | Facility: MEDICAL CENTER | Age: 74
End: 2024-04-22
Payer: MEDICARE

## 2024-04-22 ENCOUNTER — APPOINTMENT (OUTPATIENT)
Dept: CARDIOLOGY | Facility: MEDICAL CENTER | Age: 74
End: 2024-04-22
Attending: INTERNAL MEDICINE
Payer: MEDICARE

## 2024-05-06 DIAGNOSIS — I21.09: ICD-10-CM

## 2024-05-06 DIAGNOSIS — I10 ESSENTIAL HYPERTENSION: ICD-10-CM

## 2024-05-06 NOTE — TELEPHONE ENCOUNTER
Is the patient due for a refill? Yes    Was the patient seen the past year? No    Date of last office visit: 2/7/23    Does the patient have an upcoming appointment?  Yes   If yes, When? 5/23/24    Provider to refill:TW    Does the patients insurance require a 100 day supply?  No

## 2024-05-06 NOTE — TELEPHONE ENCOUNTER
Received request via: Patient    Was the patient seen in the last year in this department? Yes    Does the patient have an active prescription (recently filled or refills available) for medication(s) requested?  YES    Pharmacy Name: [facility]    Does the patient have nursing home Plus and need 100 day supply (blood pressure, diabetes and cholesterol meds only)? no

## 2024-05-07 RX ORDER — CARVEDILOL 3.12 MG/1
3.12 TABLET ORAL 2 TIMES DAILY WITH MEALS
Qty: 60 TABLET | Refills: 0 | Status: SHIPPED | OUTPATIENT
Start: 2024-05-07

## 2024-05-23 ENCOUNTER — OFFICE VISIT (OUTPATIENT)
Dept: CARDIOLOGY | Facility: MEDICAL CENTER | Age: 74
End: 2024-05-23
Attending: INTERNAL MEDICINE
Payer: MEDICARE

## 2024-05-23 VITALS
HEIGHT: 75 IN | DIASTOLIC BLOOD PRESSURE: 74 MMHG | BODY MASS INDEX: 23 KG/M2 | SYSTOLIC BLOOD PRESSURE: 140 MMHG | OXYGEN SATURATION: 98 % | RESPIRATION RATE: 16 BRPM | HEART RATE: 74 BPM | WEIGHT: 185 LBS

## 2024-05-23 DIAGNOSIS — E78.2 HYPERLIPIDEMIA, MIXED: ICD-10-CM

## 2024-05-23 DIAGNOSIS — Z12.5 SCREENING PSA (PROSTATE SPECIFIC ANTIGEN): ICD-10-CM

## 2024-05-23 DIAGNOSIS — I25.10 CORONARY ARTERY DISEASE INVOLVING NATIVE CORONARY ARTERY OF NATIVE HEART WITHOUT ANGINA PECTORIS: ICD-10-CM

## 2024-05-23 DIAGNOSIS — I10 ESSENTIAL HYPERTENSION: ICD-10-CM

## 2024-05-23 PROCEDURE — G2211 COMPLEX E/M VISIT ADD ON: HCPCS | Performed by: INTERNAL MEDICINE

## 2024-05-23 PROCEDURE — 99214 OFFICE O/P EST MOD 30 MIN: CPT | Performed by: INTERNAL MEDICINE

## 2024-05-23 PROCEDURE — 3078F DIAST BP <80 MM HG: CPT | Performed by: INTERNAL MEDICINE

## 2024-05-23 PROCEDURE — 3077F SYST BP >= 140 MM HG: CPT | Performed by: INTERNAL MEDICINE

## 2024-05-23 NOTE — PROGRESS NOTES
Subjective:   Samuel Luong is a 74 y.o. male who presents today for follow-up of CAD.  Has a history of mixed hyperlipidemia and mild hypertension and experienced anterolateral apical MI 2009 and received a SIMA to the proximal LAD. He has preserved left ventricular ejection fraction.     Doing well since last visit.  Very active no exertional symptoms takes medications as directed without side effects.    Past Medical History:   Diagnosis Date    Acute MI, anterolateral wall (HCC)     Robert-lateral-apical MI    CAD (coronary artery disease) 12/21/2011    Chest pain, unspecified     HTN (hypertension) 2/5/2013    Hyperlipidemia, mixed     Presence of drug coated stent in LAD coronary artery     2009 3.0 x 18mm drug eluting stent too proximal LAD     History reviewed. No pertinent surgical history.  Family History   Problem Relation Age of Onset    Non-contributory Mother     Non-contributory Father      Social History     Tobacco Use   Smoking Status Never   Smokeless Tobacco Never     Allergies   Allergen Reactions    Penicillins     Statins [Hmg-Coa-R Inhibitors]      myalgia     Outpatient Encounter Medications as of 5/23/2024   Medication Sig Dispense Refill    carvedilol (COREG) 3.125 MG Tab Take 1 Tablet by mouth 2 times a day with meals. 60 Tablet 0    ezetimibe (ZETIA) 10 MG Tab Take 1 Tablet by mouth every day. 90 Tablet 3    niacin SR (NIASPAN) 500 MG Tab CR Take 1/2 tablet daily. 45 Tablet 2    rosuvastatin (CRESTOR) 40 MG tablet Take 1 Tablet by mouth every day. Please keep follow up appointment for further refills. Thank you! 90 Tablet 2    ciprofloxacin/dexamethasone (CIPRODEX) 0.3-0.1 % Suspension UTD      aspirin (ASA) 325 MG TABS Take 325 mg by mouth every day.      [DISCONTINUED] triazolam (HALCION) 0.25 MG Tab TAKE 1 TABLET BY MOUTH 1 HOUR BEFORE DENTAL APPOINTMENT (Patient not taking: Reported on 2/7/2023)       No facility-administered encounter medications on file as of 5/23/2024.  "    Review of Systems   All other systems reviewed and are negative.       Objective:   BP (!) 140/74 (BP Location: Left arm, Patient Position: Sitting)   Pulse 74   Resp 16   Ht 1.892 m (6' 2.5\")   Wt 83.9 kg (185 lb)   SpO2 98%   BMI 23.43 kg/m²     Physical Exam  Constitutional:       General: He is not in acute distress.     Appearance: He is well-developed. He is not diaphoretic.      Comments: Tall thin and athletic appearing   HENT:      Head: Normocephalic and atraumatic.   Eyes:      General: No scleral icterus.     Conjunctiva/sclera: Conjunctivae normal.      Pupils: Pupils are equal, round, and reactive to light.   Neck:      Thyroid: No thyromegaly.      Vascular: No JVD.      Trachea: No tracheal deviation.   Cardiovascular:      Rate and Rhythm: Normal rate and regular rhythm.      Chest Wall: PMI is not displaced.      Pulses:           Carotid pulses are 2+ on the right side and 2+ on the left side.       Radial pulses are 2+ on the right side and 2+ on the left side.        Dorsalis pedis pulses are 2+ on the right side and 2+ on the left side.        Posterior tibial pulses are 2+ on the right side and 2+ on the left side.      Heart sounds: Normal heart sounds and S1 normal. No murmur heard.     No friction rub. No gallop.   Pulmonary:      Effort: Pulmonary effort is normal.      Breath sounds: Normal breath sounds. No wheezing or rales.   Abdominal:      General: Bowel sounds are normal. There is no distension.      Palpations: Abdomen is soft. There is no pulsatile mass.      Tenderness: There is no abdominal tenderness. There is no guarding.   Skin:     General: Skin is warm and dry.      Findings: No erythema or rash.   Neurological:      Mental Status: He is alert and oriented to person, place, and time.      Cranial Nerves: No cranial nerve deficit (cranial nerves II through XII grossly intact).   Psychiatric:         Behavior: Behavior normal.         Thought Content: Thought " "content normal.         LABS:  Lab Results   Component Value Date/Time    CHOLSTRLTOT 113 12/24/2021 09:33 AM    LDL 54 12/24/2021 09:33 AM    HDL 43 12/24/2021 09:33 AM    TRIGLYCERIDE 79 12/24/2021 09:33 AM       Lab Results   Component Value Date/Time    WBC 5.8 07/23/2013 06:55 AM    RBC 4.97 07/23/2013 06:55 AM    HEMOGLOBIN 15.9 07/23/2013 06:55 AM    HEMATOCRIT 48.3 07/23/2013 06:55 AM    MCV 97 07/23/2013 06:55 AM    NEUTSPOLYS 58 07/23/2013 06:55 AM    LYMPHOCYTES 30 07/23/2013 06:55 AM    MONOCYTES 9 07/23/2013 06:55 AM    EOSINOPHILS 3 07/23/2013 06:55 AM    BASOPHILS 0 07/23/2013 06:55 AM     Lab Results   Component Value Date/Time    SODIUM 143 12/24/2021 09:33 AM    POTASSIUM 4.0 12/24/2021 09:33 AM    CHLORIDE 108 12/24/2021 09:33 AM    CO2 25 12/24/2021 09:33 AM    GLUCOSE 106 (H) 12/24/2021 09:33 AM    BUN 13 12/24/2021 09:33 AM    CREATININE 0.95 12/24/2021 09:33 AM    CREATININE 1.05 01/22/2013 06:39 AM    BUNCREATRAT 13 08/14/2014 06:50 AM    BUNCREATRAT 14 01/22/2013 06:39 AM         Lab Results   Component Value Date/Time    ALKPHOSPHAT 52 12/24/2021 09:33 AM    ASTSGOT 23 12/24/2021 09:33 AM    ALTSGPT 36 12/24/2021 09:33 AM    TBILIRUBIN 0.9 12/24/2021 09:33 AM      No results found for: \"BNPBTYPENAT\"   No results found for: \"TSH\"  No results found for: \"PROTHROMBTM\", \"INR\"       Assessment:     1. Coronary artery disease involving native coronary artery of native heart without angina pectoris        2. Essential hypertension        3. Hyperlipidemia, mixed  Comp Metabolic Panel      4. Screening PSA (prostate specific antigen)  PROSTATE SPECIFIC AG SCREENING          Medical Decision Making:  Today's Assessment / Status / Plan:     Doing well achieving goal LDL.  Needs a CMP which was for some reason difficult to get accomplished.  I reordered it today.  PSA ordered as a courtesy for him.  He will follow-up with his PCP regarding it.  There is data for colchicine for secondary prevention " however his MI was so remote the data may not be as applicable to him and therefore of less particular beneficial for him.  We should discuss further at next visit.    () Today's E/M visit is associated with medical care services that serve as the continuing focal point for all needed health care services and/or with medical care services that  are part of ongoing care related to a patient's single, serious condition, or a complex condition: This includes  furnishing services to patients on an ongoing basis that result in care that is personalized  to the patient. The services result in a comprehensive, longitudinal, and continuous  relationship with the patient and involve delivery of team-based care that is accessible, coordinated with other practitioners and providers, and integrated with the broader health  care landscape.

## 2024-05-24 ENCOUNTER — TELEPHONE (OUTPATIENT)
Dept: MEDICAL GROUP | Age: 74
End: 2024-05-24
Payer: MEDICARE

## 2024-05-30 ENCOUNTER — OFFICE VISIT (OUTPATIENT)
Dept: MEDICAL GROUP | Age: 74
End: 2024-05-30
Payer: MEDICARE

## 2024-05-30 VITALS
DIASTOLIC BLOOD PRESSURE: 80 MMHG | TEMPERATURE: 97.4 F | HEIGHT: 71 IN | WEIGHT: 184 LBS | BODY MASS INDEX: 25.76 KG/M2 | SYSTOLIC BLOOD PRESSURE: 140 MMHG | OXYGEN SATURATION: 97 % | HEART RATE: 75 BPM

## 2024-05-30 DIAGNOSIS — Z11.59 NEED FOR HEPATITIS C SCREENING TEST: ICD-10-CM

## 2024-05-30 DIAGNOSIS — I25.10 CORONARY ARTERY DISEASE INVOLVING NATIVE CORONARY ARTERY OF NATIVE HEART WITHOUT ANGINA PECTORIS: ICD-10-CM

## 2024-05-30 DIAGNOSIS — Z00.00 ENCOUNTER FOR MEDICAL EXAMINATION TO ESTABLISH CARE: ICD-10-CM

## 2024-05-30 DIAGNOSIS — N40.1 BENIGN PROSTATIC HYPERPLASIA WITH LOWER URINARY TRACT SYMPTOMS, SYMPTOM DETAILS UNSPECIFIED: ICD-10-CM

## 2024-05-30 DIAGNOSIS — E78.00 PURE HYPERCHOLESTEROLEMIA: ICD-10-CM

## 2024-05-30 ASSESSMENT — PATIENT HEALTH QUESTIONNAIRE - PHQ9: CLINICAL INTERPRETATION OF PHQ2 SCORE: 0

## 2024-05-30 NOTE — PROGRESS NOTES
This medical record contains text that has been entered with the assistance of computer voice recognition and dictation software.  Therefore, it may contain unintended errors in text, spelling, punctuation, or grammar      Chief Complaint   Patient presents with    [facility]     N2U         Samuel Luong is a 74 y.o. male here evaluation and management of: establish care      HPI:           1. Encounter for medical examination to establish care  Samuel is a very pleasant 74-year-old male who presents to clinic to [facility].  He has a history of acute MI/CAD status post SIMA LAD 2009, hypertension hyperlipidemia    History of Present Illness      The patient, a  , is due for future laboratory tests. He maintains an active lifestyle, engaging in weightlifting exercises on Sundays, Nordic track and cross country on Tuesdays, golfing on Thursdays, stationary cycling on Fridays, and golfing on Saturdays. He navigates 6500 steps daily, utilizing a bater for safety. His previous primary care physician was Dr. Jarrett and Dr. Zamarripa.          Today the patient denied any fevers, chills, headaches, nausea, vomiting, changes in vision, shortness of breath, back pain, chest pain, nausea or vomiting, change in taste or smell, new rashes, joint pain, blood in stool dark tarry stool.      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 Pure hypercholesterolemia  Rosuvastatin 40 mg p.o. nightly  Zetia 10 mg p.o. daily    The patient has been on a statin for years and tolerating this fine. The patient denies any  muscle aches, no abdominal pain and no history of elevated liver enzymes.              3. CAD s/p SIMA LAD 2009  The patient experienced an MI in 2009, anterior wall  Status post SIMA LAD 2009  Continues to be active and asymptomatic  Is on high intensity statin therapy aspirin 325 mg Zetia and carvedilol    Current medicines (including changes today)  Current Outpatient Medications   Medication Sig Dispense Refill    carvedilol (COREG) 3.125 MG Tab Take 1 Tablet by mouth 2 times a day with meals. 60 Tablet 0    ezetimibe (ZETIA) 10 MG Tab Take 1 Tablet by mouth every day. 90 Tablet 3    niacin SR (NIASPAN) 500 MG Tab CR Take 1/2 tablet daily. 45 Tablet 2    rosuvastatin (CRESTOR) 40 MG tablet Take 1 Tablet by mouth every day. Please keep follow up appointment for further refills. Thank you! 90 Tablet 2    ciprofloxacin/dexamethasone (CIPRODEX) 0.3-0.1 % Suspension UTD      aspirin (ASA) 325 MG TABS Take 325 mg by mouth every day.       No current facility-administered medications for this visit.     He  has a past medical history of Acute MI, anterolateral wall (HCC), CAD (coronary artery disease) (12/21/2011), Chest pain, unspecified, HTN (hypertension) (2/5/2013), Hyperlipidemia, mixed, and Presence of drug coated stent in LAD coronary artery.  He  has no past surgical history on file.  Social History     Tobacco Use    Smoking status: Never    Smokeless tobacco: Never   Vaping Use    Vaping status: Never Used   Substance Use Topics    Alcohol use: Yes     Comment: occ.    Drug use: Never     Social History     Social History Narrative    Not on file     Family History   Problem Relation Age of Onset    Non-contributory Mother     Non-contributory Father      Family Status   Relation Name Status    Mo  (Not Specified)    Fa  (Not Specified)         ROS    The pertinent  ROS findings can be seen in the HPI above.     All other systems reviewed and are negative     Objective:     BP (!) 140/80 (BP Location: Left arm,  "Patient Position: Sitting, BP Cuff Size: Adult)   Pulse 75   Temp 36.3 °C (97.4 °F) (Temporal)   Ht 1.803 m (5' 11\")   Wt 83.5 kg (184 lb)   SpO2 97%  Body mass index is 25.66 kg/m².      Physical Exam:    Constitutional: Alert, no distress.  Skin: No suspicious lesions  Eye: Equal, round and reactive, conjunctiva clear, lids normal.  ENMT: Lips without lesions, good dentition, oropharynx clear.  Neck: Trachea midline, no masses, no thyromegaly. No cervical or supraclavicular lymphadenopathy.  Respiratory: Unlabored respiratory effort, lungs clear to auscultation, no wheezes, no ronchi.  Cardiovascular: Normal S1, S2, no murmur, no edema  Abdomen: Soft, non-tender, no masses, no hepatosplenomegaly.        Assessment and Plan:   The following treatment plan was discussed    All recent labs and provider notes reviewed    1. Encounter for medical examination to establish care    Care has been established  He is very active  He is up-to-date on labs and vaccines    2. Pure hypercholesterolemia    Future labs    - CBC WITH DIFFERENTIAL; Future  - Comp Metabolic Panel; Future  - Lipid Profile; Future  - T3 FREE; Future  - TSH+FREE T4  - VITAMIN D,25 HYDROXY (DEFICIENCY); Future  - HEP C VIRUS ANTIBODY; Future        3. CAD s/p SIMA LAD 2009  Patient has been stable with current management  We will make no changes for now      4. Benign prostatic hyperplasia with lower urinary tract symptoms, symptom details unspecified    - PROSTATE SPECIFIC AG DIAGNOSTIC; Future    5. Need for hepatitis C screening test    - HEP C VIRUS ANTIBODY; Future       Instructed to Follow up in clinic or ER for worsening symptoms, difficulty breathing, lack of expected recovery, or should new symptoms or problems arise.    Followup: Return in about 6 months (around 11/30/2024) for Reevaluation, labs.               "

## 2024-06-10 ENCOUNTER — TELEPHONE (OUTPATIENT)
Dept: CARDIOLOGY | Facility: MEDICAL CENTER | Age: 74
End: 2024-06-10
Payer: MEDICARE

## 2024-06-10 DIAGNOSIS — I10 ESSENTIAL HYPERTENSION: ICD-10-CM

## 2024-06-10 DIAGNOSIS — I21.09: ICD-10-CM

## 2024-06-10 NOTE — TELEPHONE ENCOUNTER
Is the patient due for a refill? Yes    Was the patient seen the past year? Yes    Date of last office visit: 5/23/24    Does the patient have an upcoming appointment?  No    Provider to refill:TW    Does the patients insurance require a 100 day supply?  No

## 2024-06-10 NOTE — TELEPHONE ENCOUNTER
TW  Received request via: Patient    Was the patient seen in the last year in this department? Yes 5/23/24    Does the patient have an active prescription (recently filled or refills available) for medication(s) requested? Yes.    Pharmacy Name: [id number] - [place], [place] - [address] AT [address] [facility] [address] [[id number]]     Does the patient have long term Plus and need 100 day supply (blood pressure, diabetes and cholesterol meds only)? Patient does not have SCP    Thank you  Janneth LEVY

## 2024-06-14 RX ORDER — CARVEDILOL 3.12 MG/1
3.12 TABLET ORAL 2 TIMES DAILY WITH MEALS
Qty: 180 TABLET | Refills: 3 | Status: SHIPPED | OUTPATIENT
Start: 2024-06-14

## 2024-06-14 NOTE — TELEPHONE ENCOUNTER
TW  Caller: Samuel Luong     Topic/issue: Patient is following up on his medication , he states he only has medication for the past two days.    Callback Number: [phone]     Thank you  Janneth LEVY

## 2024-10-28 DIAGNOSIS — E78.2 MIXED HYPERLIPIDEMIA: ICD-10-CM

## 2024-10-28 RX ORDER — ROSUVASTATIN CALCIUM 40 MG/1
40 TABLET, COATED ORAL
Qty: 90 TABLET | Refills: 2 | OUTPATIENT
Start: 2024-10-28

## 2024-11-25 DIAGNOSIS — E78.00 PURE HYPERCHOLESTEROLEMIA: ICD-10-CM

## 2024-11-25 NOTE — TELEPHONE ENCOUNTER
Received request via: Patient    Was the patient seen in the last year in this department? Yes    Does the patient have an active prescription (recently filled or refills available) for medication(s) requested? No    [facility]: [facility]     Does the patient have assisted Plus and need 100-day supply? (This applies to ALL medications) Patient does not have SCP

## 2024-11-26 RX ORDER — NIACIN 500 MG/1
TABLET, EXTENDED RELEASE ORAL
Qty: 45 TABLET | Refills: 2 | OUTPATIENT
Start: 2024-11-26 | End: 2025-11-21

## 2024-11-26 NOTE — TELEPHONE ENCOUNTER
TW    Received request via: Patient    Was the patient seen in the last year in this department? Yes    Does the patient have an active prescription (recently filled or refills available) for medication(s) requested? No    Pharmacy Name:     [id number] - [place], [place] - [address]  AT [address] [[id number]]     Does the patient have intermediate Plus and need 100-day supply? (This applies to ALL medications) Patient does not have SCP

## 2024-12-02 NOTE — TELEPHONE ENCOUNTER
Caller: Samuel Luong     Topic/issue: Patient is requesting a call back about the medication refill.  Patient states that he has two pills left.    Callback Number: [phone]     Thank you,  Carmen GARCIA

## 2024-12-02 NOTE — TELEPHONE ENCOUNTER
Phone Number Called: [phone]     Call outcome: Spoke to patient regarding message below.    Message: Called to advise pt to reach out to PCP for further refills of niacin

## 2025-01-15 ENCOUNTER — PATIENT MESSAGE (OUTPATIENT)
Dept: CARDIOLOGY | Facility: MEDICAL CENTER | Age: 75
End: 2025-01-15

## 2025-01-15 ENCOUNTER — TELEPHONE (OUTPATIENT)
Dept: CARDIOLOGY | Facility: MEDICAL CENTER | Age: 75
End: 2025-01-15

## 2025-01-15 ENCOUNTER — OFFICE VISIT (OUTPATIENT)
Dept: CARDIOLOGY | Facility: MEDICAL CENTER | Age: 75
End: 2025-01-15
Payer: MEDICARE

## 2025-01-15 VITALS
WEIGHT: 192 LBS | DIASTOLIC BLOOD PRESSURE: 86 MMHG | SYSTOLIC BLOOD PRESSURE: 156 MMHG | BODY MASS INDEX: 24.64 KG/M2 | RESPIRATION RATE: 16 BRPM | HEART RATE: 80 BPM | OXYGEN SATURATION: 97 % | HEIGHT: 74 IN

## 2025-01-15 DIAGNOSIS — R60.0 LOWER EXTREMITY EDEMA: ICD-10-CM

## 2025-01-15 DIAGNOSIS — I10 ESSENTIAL HYPERTENSION: ICD-10-CM

## 2025-01-15 DIAGNOSIS — E78.00 PURE HYPERCHOLESTEROLEMIA: ICD-10-CM

## 2025-01-15 DIAGNOSIS — I25.10 CORONARY ARTERY DISEASE INVOLVING NATIVE CORONARY ARTERY OF NATIVE HEART WITHOUT ANGINA PECTORIS: ICD-10-CM

## 2025-01-15 PROCEDURE — 99214 OFFICE O/P EST MOD 30 MIN: CPT

## 2025-01-15 PROCEDURE — 99212 OFFICE O/P EST SF 10 MIN: CPT

## 2025-01-15 PROCEDURE — 3077F SYST BP >= 140 MM HG: CPT

## 2025-01-15 PROCEDURE — 3079F DIAST BP 80-89 MM HG: CPT

## 2025-01-15 RX ORDER — POTASSIUM CHLORIDE 750 MG/1
10 CAPSULE, EXTENDED RELEASE ORAL DAILY
Qty: 30 CAPSULE | Refills: 0 | Status: SHIPPED | OUTPATIENT
Start: 2025-01-15

## 2025-01-15 RX ORDER — POTASSIUM CHLORIDE 750 MG/1
10 CAPSULE, EXTENDED RELEASE ORAL DAILY
Qty: 90 CAPSULE | OUTPATIENT
Start: 2025-01-15

## 2025-01-15 RX ORDER — FUROSEMIDE 20 MG/1
20 TABLET ORAL DAILY
Qty: 90 TABLET | OUTPATIENT
Start: 2025-01-15

## 2025-01-15 RX ORDER — FUROSEMIDE 20 MG/1
20 TABLET ORAL DAILY
Qty: 30 TABLET | Refills: 0 | Status: SHIPPED | OUTPATIENT
Start: 2025-01-15

## 2025-01-15 ASSESSMENT — ENCOUNTER SYMPTOMS
HEADACHES: 0
DIZZINESS: 0
LIGHT-HEADEDNESS: 0
SYNCOPE: 0
PALPITATIONS: 0
PND: 0
NEAR-SYNCOPE: 0
SHORTNESS OF BREATH: 0
DYSPNEA ON EXERTION: 0
ORTHOPNEA: 0
POLYDIPSIA: 1

## 2025-01-15 NOTE — TELEPHONE ENCOUNTER
Caller: Samuel Luong      Topic/issue: Patient was calling to follow up on his medications furosemide (LASIX) 20 MG Tab and his potassium chloride (MICRO-K) 10 MEQ capsule being sent to [facility]. He checked with them earlier this morning and they told him the medication were delayed and he was following up on this. Please advise      Callback Number: [phone]      Thank you    -Parth CAMPOS

## 2025-01-15 NOTE — PROGRESS NOTES
"Chief Complaint   Patient presents with    Coronary Artery Disease     Follow up          Subjective:   Samuel Luong is a 74 y.o. male who presents today for follow-up.     Patient of Dr. Phillips.  Current medical problems include mixed hyperlipidemia and mild hypertension and experienced anterolateral apical MI 2009 and received a SIMA to the proximal LAD. He has preserved left ventricular ejection fraction. Their last clinic visit was 5/23/2024 with Dr. Phillips.    Today's visit:  Patient presents today for follow-up with his wife.  Patient reports a variety of new concerns that started on December 14, 2024 where he had bilateral edema on his lower extremities.  Patient also reports increased urination, increased thirst, kidney flank pain and increased nasal congestion.  Patient reports he has been taking a variety of over-the-counter supplements for his prostate.  He has since stopped taking them due to concern it was causing his symptoms.  Patient reports that he has still been able to do his activity and exercises 3 times a week for 20 minutes on a stationary bike.  He denies chest pain, shortness of breath, palpitations, orthopnea, PND, lightheaded/dizziness, or syncope.  He denies any fevers or chills. He takes his blood pressure at home and reports that is has been 130s/70s. E has been taking all his medications as prescribed.     Cardiovascular Risk Factors:  1. Smoking status: Never  2. Type II Diabetes Mellitus: No No results found for: \"HBA1C\" Denies  3. Hypertension: Yes  4. Dyslipidemia: Yes   Cholesterol,Tot   Date Value Ref Range Status   12/24/2021 113 100 - 199 mg/dL Final     LDL   Date Value Ref Range Status   12/24/2021 54 <100 mg/dL Final     HDL   Date Value Ref Range Status   12/24/2021 43 >=40 mg/dL Final     Triglycerides   Date Value Ref Range Status   12/24/2021 79 0 - 149 mg/dL Final       Past Medical History:   Diagnosis Date    Acute MI, anterolateral wall (HCC)     " "Robert-lateral-apical MI    CAD (coronary artery disease) 12/21/2011    Chest pain, unspecified     HTN (hypertension) 2/5/2013    Hyperlipidemia, mixed     Presence of drug coated stent in LAD coronary artery     2009 3.0 x 18mm drug eluting stent too proximal LAD         Family History   Problem Relation Age of Onset    Non-contributory Mother     Non-contributory Father          Social History     Tobacco Use    Smoking status: Never    Smokeless tobacco: Never   Vaping Use    Vaping status: Never Used   Substance Use Topics    Alcohol use: Yes     Comment: occ.    Drug use: Never         Allergies   Allergen Reactions    Penicillins Rash    Statins [Hmg-Coa-R Inhibitors]      myalgia         Current Outpatient Medications   Medication Sig    furosemide (LASIX) 20 MG Tab Take 1 Tablet by mouth every day.    potassium chloride (MICRO-K) 10 MEQ capsule Take 1 Capsule by mouth every day.    rosuvastatin (CRESTOR) 40 MG tablet Take 1 Tablet by mouth every day. Please keep follow up appointment for further refills. Thank you!    carvedilol (COREG) 3.125 MG Tab Take 1 Tablet by mouth 2 times a day with meals.    ezetimibe (ZETIA) 10 MG Tab Take 1 Tablet by mouth every day.    ciprofloxacin/dexamethasone (CIPRODEX) 0.3-0.1 % Suspension UTD    aspirin (ASA) 325 MG TABS Take 325 mg by mouth every day.         Review of Systems   Constitutional: Negative for malaise/fatigue.   HENT:  Positive for congestion.    Cardiovascular:  Positive for leg swelling. Negative for chest pain, dyspnea on exertion, near-syncope, orthopnea, palpitations, paroxysmal nocturnal dyspnea and syncope.   Respiratory:  Negative for shortness of breath.    Endocrine: Positive for polydipsia and polyuria.   Genitourinary:  Positive for urgency.   Neurological:  Negative for dizziness, headaches and light-headedness.           Objective:   BP (!) 156/86 (BP Location: Left arm, Patient Position: Sitting)   Pulse 80   Resp 16   Ht 1.882 m (6' 2.1\")  " " Wt 87.1 kg (192 lb)   SpO2 97%  Body mass index is 24.58 kg/m².         Physical Exam  Constitutional:       General: He is not in acute distress.  HENT:      Head: Normocephalic and atraumatic.   Cardiovascular:      Rate and Rhythm: Normal rate and regular rhythm.      Pulses: Normal pulses.      Heart sounds: No murmur heard.  Pulmonary:      Effort: Pulmonary effort is normal. No respiratory distress.      Breath sounds: Normal breath sounds.   Musculoskeletal:      Right lower le+ Edema present.      Left lower le+ Edema present.   Neurological:      Mental Status: He is alert and oriented to person, place, and time.      Gait: Gait normal.   Psychiatric:         Behavior: Behavior normal.             Lab Results   Component Value Date/Time    SODIUM 143 2021 09:33 AM    POTASSIUM 4.0 2021 09:33 AM    CHLORIDE 108 2021 09:33 AM    CO2 25 2021 09:33 AM    GLUCOSE 106 (H) 2021 09:33 AM    BUN 13 2021 09:33 AM    CREATININE 0.95 2021 09:33 AM    CREATININE 1.05 2013 06:39 AM    BUNCREATRAT 13 2014 06:50 AM    BUNCREATRAT 14 2013 06:39 AM      Lab Results   Component Value Date/Time    WBC 5.8 2013 06:55 AM    RBC 4.97 2013 06:55 AM    HEMOGLOBIN 15.9 2013 06:55 AM    HEMATOCRIT 48.3 2013 06:55 AM    MCV 97 2013 06:55 AM    MCH 32.0 2013 06:55 AM    MCHC 32.9 2013 06:55 AM    NEUTSPOLYS 58 2013 06:55 AM    LYMPHOCYTES 30 2013 06:55 AM    MONOCYTES 9 2013 06:55 AM    EOSINOPHILS 3 2013 06:55 AM    BASOPHILS 0 2013 06:55 AM      Lab Results   Component Value Date/Time    CHOLSTRLTOT 113 2021 09:33 AM    LDL 54 2021 09:33 AM    HDL 43 2021 09:33 AM    TRIGLYCERIDE 79 2021 09:33 AM       No results found for: \"TROPONINT\"  No results found for: \"NTPROBNP\"  Assessment:   1. Lower extremity edema  - furosemide (LASIX) 20 MG Tab; Take 1 Tablet by mouth every " day.  Dispense: 30 Tablet; Refill: 0  - potassium chloride (MICRO-K) 10 MEQ capsule; Take 1 Capsule by mouth every day.  Dispense: 30 Capsule; Refill: 0  - EC-ECHOCARDIOGRAM COMPLETE W/O CONT; Future  - HEMOGLOBIN A1C (Glycohemoglobin GHB Total/A1C with MBG Estimate); Future    2. Pure hypercholesterolemia    3. Essential hypertension    4. Coronary artery disease involving native coronary artery of native heart without angina pectoris        Medical Decision Making:  Today's Assessment / Plan:   Lower extremity edema  -Patient reports bilateral lower extremity edema since December. Today is improved then when first occurred since stopping supplements. Denies any other cardiac symptoms.  -Start lasix 20 mg with 10 mEq with potassium for 3 days to see if improves edema  -CMP to monitor kidney and electrolyte  -Weigh yourself daily and keep a log  -Repeat echocardiogram ordered to evaluate for any structural abnormalities.   -Discussed possible LE ultrasound to evaluate for venous insufficiency but patient did not want to move forward with that at this time.   -[facility]    CAD hx PCI SIMA 2009  Hyperlipidemia  Denies any angina or dyspnea  -Continue carvedilol 3.125 mg twice a day  -Continue asa daily  -Continue crestor 40 mg daily and zetia 10 mg daily  -Recent LDL 54 at goal  -Lipid panel ordered by Dr. Phillips and patient to get labs prior to follow up    Hypertension  - Poor control, patient reports elevated blood pressure in doctor office but controlled at home  - continue carvedilol 3.125 twice a day  - goal < 130/80  -Keep a blood pressure log and bring to follow up    Urinary Urgency  -Patient has PSA screening ordered by PCP and follow up with PCP next month. Patient to discuss symptoms with PCP and Dr. Shelton to decide on if needs referral to urology.   -Patient denies pain with urination, fevers, chills, or hematuria. If further concern for UTI  discussed patient to go to urgent care for POC testing.   -Hemoglobin A1C ordered for diabetes screening    Patient wants to keep follow up with Dr. Phillips despite appointment today.     Return in about 4 weeks (around 2/12/2025) for Dr. Phillips.  Sooner if problems.    YON Almanza.

## 2025-01-16 NOTE — TELEPHONE ENCOUNTER
"Reviewed HL last OV note, \"-Start lasix 20 mg with 10 mEq with potassium for 3 days to see if improves edema.\"  Medication refill request refused.    "

## 2025-01-16 NOTE — TELEPHONE ENCOUNTER
Called [facility] , [phone] and s/w representative that states it will be ready for  in 10-15 minutes.  Noted findings.    Called pt to review findings.  He verbalizes understanding and states no other concerns or questions at this time. Samuel is appreciative of information given.

## 2025-01-17 ENCOUNTER — HOSPITAL ENCOUNTER (EMERGENCY)
Facility: MEDICAL CENTER | Age: 75
End: 2025-01-17
Attending: EMERGENCY MEDICINE
Payer: MEDICARE

## 2025-01-17 ENCOUNTER — TELEPHONE (OUTPATIENT)
Dept: CARDIOLOGY | Facility: MEDICAL CENTER | Age: 75
End: 2025-01-17
Payer: MEDICARE

## 2025-01-17 VITALS
DIASTOLIC BLOOD PRESSURE: 80 MMHG | OXYGEN SATURATION: 98 % | WEIGHT: 192.02 LBS | BODY MASS INDEX: 24.64 KG/M2 | SYSTOLIC BLOOD PRESSURE: 158 MMHG | TEMPERATURE: 97 F | HEIGHT: 74 IN | HEART RATE: 78 BPM | RESPIRATION RATE: 16 BRPM

## 2025-01-17 DIAGNOSIS — R33.9 URINARY RETENTION: ICD-10-CM

## 2025-01-17 DIAGNOSIS — R30.0 DYSURIA: ICD-10-CM

## 2025-01-17 LAB
ANION GAP SERPL CALC-SCNC: 8 MMOL/L (ref 7–16)
APPEARANCE UR: CLEAR
BASOPHILS # BLD AUTO: 0.4 % (ref 0–1.8)
BASOPHILS # BLD: 0.03 K/UL (ref 0–0.12)
BILIRUB UR QL STRIP.AUTO: NEGATIVE
BUN SERPL-MCNC: 20 MG/DL (ref 8–22)
CALCIUM SERPL-MCNC: 9.3 MG/DL (ref 8.5–10.5)
CHLORIDE SERPL-SCNC: 109 MMOL/L (ref 96–112)
CO2 SERPL-SCNC: 26 MMOL/L (ref 20–33)
COLOR UR: YELLOW
CREAT SERPL-MCNC: 1.52 MG/DL (ref 0.5–1.4)
EOSINOPHIL # BLD AUTO: 0.09 K/UL (ref 0–0.51)
EOSINOPHIL NFR BLD: 1.1 % (ref 0–6.9)
ERYTHROCYTE [DISTWIDTH] IN BLOOD BY AUTOMATED COUNT: 44 FL (ref 35.9–50)
GFR SERPLBLD CREATININE-BSD FMLA CKD-EPI: 47 ML/MIN/1.73 M 2
GLUCOSE SERPL-MCNC: 97 MG/DL (ref 65–99)
GLUCOSE UR STRIP.AUTO-MCNC: NEGATIVE MG/DL
HCT VFR BLD AUTO: 46.8 % (ref 42–52)
HGB BLD-MCNC: 15.4 G/DL (ref 14–18)
IMM GRANULOCYTES # BLD AUTO: 0.02 K/UL (ref 0–0.11)
IMM GRANULOCYTES NFR BLD AUTO: 0.2 % (ref 0–0.9)
KETONES UR STRIP.AUTO-MCNC: NEGATIVE MG/DL
LEUKOCYTE ESTERASE UR QL STRIP.AUTO: NEGATIVE
LYMPHOCYTES # BLD AUTO: 0.93 K/UL (ref 1–4.8)
LYMPHOCYTES NFR BLD: 11.4 % (ref 22–41)
MCH RBC QN AUTO: 31.1 PG (ref 27–33)
MCHC RBC AUTO-ENTMCNC: 32.9 G/DL (ref 32.3–36.5)
MCV RBC AUTO: 94.5 FL (ref 81.4–97.8)
MICRO URNS: NORMAL
MONOCYTES # BLD AUTO: 0.68 K/UL (ref 0–0.85)
MONOCYTES NFR BLD AUTO: 8.3 % (ref 0–13.4)
NEUTROPHILS # BLD AUTO: 6.4 K/UL (ref 1.82–7.42)
NEUTROPHILS NFR BLD: 78.6 % (ref 44–72)
NITRITE UR QL STRIP.AUTO: NEGATIVE
NRBC # BLD AUTO: 0 K/UL
NRBC BLD-RTO: 0 /100 WBC (ref 0–0.2)
NT-PROBNP SERPL IA-MCNC: 885 PG/ML (ref 0–125)
PH UR STRIP.AUTO: 5.5 [PH] (ref 5–8)
PLATELET # BLD AUTO: 255 K/UL (ref 164–446)
PMV BLD AUTO: 9.7 FL (ref 9–12.9)
POTASSIUM SERPL-SCNC: 4.1 MMOL/L (ref 3.6–5.5)
PROT UR QL STRIP: NEGATIVE MG/DL
RBC # BLD AUTO: 4.95 M/UL (ref 4.7–6.1)
RBC UR QL AUTO: NEGATIVE
SODIUM SERPL-SCNC: 143 MMOL/L (ref 135–145)
SP GR UR STRIP.AUTO: 1.01
TROPONIN T SERPL-MCNC: 14 NG/L (ref 6–19)
UROBILINOGEN UR STRIP.AUTO-MCNC: 0.2 EU/DL
WBC # BLD AUTO: 8.2 K/UL (ref 4.8–10.8)

## 2025-01-17 PROCEDURE — 36415 COLL VENOUS BLD VENIPUNCTURE: CPT

## 2025-01-17 PROCEDURE — 85025 COMPLETE CBC W/AUTO DIFF WBC: CPT

## 2025-01-17 PROCEDURE — 51798 US URINE CAPACITY MEASURE: CPT

## 2025-01-17 PROCEDURE — 99284 EMERGENCY DEPT VISIT MOD MDM: CPT

## 2025-01-17 PROCEDURE — 81003 URINALYSIS AUTO W/O SCOPE: CPT

## 2025-01-17 PROCEDURE — 83880 ASSAY OF NATRIURETIC PEPTIDE: CPT

## 2025-01-17 PROCEDURE — 303105 HCHG CATHETER EXTRA

## 2025-01-17 PROCEDURE — 80048 BASIC METABOLIC PNL TOTAL CA: CPT

## 2025-01-17 PROCEDURE — 84484 ASSAY OF TROPONIN QUANT: CPT

## 2025-01-17 PROCEDURE — RXMED WILLOW AMBULATORY MEDICATION CHARGE: Performed by: EMERGENCY MEDICINE

## 2025-01-17 PROCEDURE — 51702 INSERT TEMP BLADDER CATH: CPT

## 2025-01-17 RX ORDER — TAMSULOSIN HYDROCHLORIDE 0.4 MG/1
0.4 CAPSULE ORAL DAILY
Qty: 30 CAPSULE | Refills: 0 | Status: SHIPPED | OUTPATIENT
Start: 2025-01-17 | End: 2025-01-20

## 2025-01-17 ASSESSMENT — PAIN DESCRIPTION - PAIN TYPE: TYPE: ACUTE PAIN

## 2025-01-17 NOTE — ED TRIAGE NOTES
Pt ambulated into triage with c/o dysuria and polyuria. Pt sts he has a UTI. Has not been treated. Pt also c/o bilateral foot swelling. Pt is A&O and ambulatory. UA sent from triage. Placed in lobby pending ER room.

## 2025-01-17 NOTE — ED PROVIDER NOTES
ER Provider Note    Scribed for Joaquin Chamberlain M.D. by Erin Alves. 1/17/2025   10:18 AM    Primary Care Provider: Pernell Shelton M.D.    CHIEF COMPLAINT  Chief Complaint   Patient presents with    UTI     EXTERNAL RECORDS REVIEWED  Care everywhere Patient had a cardiology appointment on 1/15/25 for CAD follow-up. Started on lasix 20 mg with 10 mEq with potassium for 3 days to see if improves edema    HPI/ROS  LIMITATION TO HISTORY   Select: : None  OUTSIDE HISTORIAN(S):  Significant other Partner at bedside contributing to history as seen below.     Samuel Luong is a 74 y.o. male who presents to the ED for evaluation of dysuria onset three days ago. Reports painful urination, frequent urination, burning with urination, rash, and low back pain. He denies a history of similar symptoms. Patient adds that he has an enlarged prostate, and a history of stent placement in 2016. Denies use of a water pill. Patient adds that he is an active golfer, and exercises 3 days a week. No alleviating or exacerbating factors noted.     PAST MEDICAL HISTORY  Past Medical History:   Diagnosis Date    Acute MI, anterolateral wall (HCC)     Robert-lateral-apical MI    CAD (coronary artery disease) 12/21/2011    Chest pain, unspecified     HTN (hypertension) 2/5/2013    Hyperlipidemia, mixed     Presence of drug coated stent in LAD coronary artery     2009 3.0 x 18mm drug eluting stent too proximal LAD       SURGICAL HISTORY  History reviewed. No pertinent surgical history.    FAMILY HISTORY  Family History   Problem Relation Age of Onset    Non-contributory Mother     Non-contributory Father        SOCIAL HISTORY   reports that he has never smoked. He has never used smokeless tobacco. He reports current alcohol use. He reports that he does not use drugs.    CURRENT MEDICATIONS  Previous Medications    ASPIRIN (ASA) 325 MG TABS    Take 325 mg by mouth every day.    CARVEDILOL (COREG) 3.125 MG TAB    Take 1 Tablet by mouth 2  "times a day with meals.    CIPROFLOXACIN/DEXAMETHASONE (CIPRODEX) 0.3-0.1 % SUSPENSION    UTD    EZETIMIBE (ZETIA) 10 MG TAB    Take 1 Tablet by mouth every day.    FUROSEMIDE (LASIX) 20 MG TAB    Take 1 Tablet by mouth every day.    POTASSIUM CHLORIDE (MICRO-K) 10 MEQ CAPSULE    Take 1 Capsule by mouth every day.    ROSUVASTATIN (CRESTOR) 40 MG TABLET    Take 1 Tablet by mouth every day. Please keep follow up appointment for further refills. Thank you!       ALLERGIES  Allergies   Allergen Reactions    Penicillins Rash    Statins [Hmg-Coa-R Inhibitors]      myalgia        PHYSICAL EXAM  BP (!) 172/106   Pulse 82   Temp 35.9 °C (96.6 °F) (Temporal)   Resp 16   Ht 1.88 m (6' 2\") Comment: Simultaneous filing. User may not have seen previous data.  Wt 87.1 kg (192 lb 0.3 oz)   SpO2 98%   BMI 24.65 kg/m²      Nursing note and vitals reviewed.  Constitutional: Well-developed and well-nourished. No distress.   HENT: Head is normocephalic and atraumatic. Oropharynx is clear and moist without exudate or erythema.   Eyes: Pupils are equal, round, and reactive to light. Conjunctiva are normal.   Cardiovascular: Normal rate and regular rhythm. No murmur heard. Normal radial pulses.  Pulmonary/Chest: Breath sounds normal. No wheezes or rales.   Abdominal: Soft and non-tender. No distention    Musculoskeletal: Extremities exhibit normal range of motion without tenderness. 2+ bilateral lower extremity edema.   Neurological: Awake, alert and oriented to person, place, and time. No focal deficits noted.  Skin: Skin is warm and dry. No rash.   Psychiatric: Normal mood and affect. Appropriate for clinical situation    DIAGNOSTIC STUDIES    Labs:   Results for orders placed or performed during the hospital encounter of 01/17/25   Urinalysis, Culture if Indicated    Collection Time: 01/17/25  9:30 AM    Specimen: Urine, Clean Catch   Result Value Ref Range    Color Yellow     Character Clear     Specific Gravity 1.011 <1.035    " Ph 5.5 5.0 - 8.0    Glucose Negative Negative mg/dL    Ketones Negative Negative mg/dL    Protein Negative Negative mg/dL    Bilirubin Negative Negative    Urobilinogen, Urine 0.2 <=1.0 EU/dL    Nitrite Negative Negative    Leukocyte Esterase Negative Negative    Occult Blood Negative Negative    Micro Urine Req see below    CBC WITH DIFFERENTIAL    Collection Time: 01/17/25 10:50 AM   Result Value Ref Range    WBC 8.2 4.8 - 10.8 K/uL    RBC 4.95 4.70 - 6.10 M/uL    Hemoglobin 15.4 14.0 - 18.0 g/dL    Hematocrit 46.8 42.0 - 52.0 %    MCV 94.5 81.4 - 97.8 fL    MCH 31.1 27.0 - 33.0 pg    MCHC 32.9 32.3 - 36.5 g/dL    RDW 44.0 35.9 - 50.0 fL    Platelet Count 255 164 - 446 K/uL    MPV 9.7 9.0 - 12.9 fL    Neutrophils-Polys 78.60 (H) 44.00 - 72.00 %    Lymphocytes 11.40 (L) 22.00 - 41.00 %    Monocytes 8.30 0.00 - 13.40 %    Eosinophils 1.10 0.00 - 6.90 %    Basophils 0.40 0.00 - 1.80 %    Immature Granulocytes 0.20 0.00 - 0.90 %    Nucleated RBC 0.00 0.00 - 0.20 /100 WBC    Neutrophils (Absolute) 6.40 1.82 - 7.42 K/uL    Lymphs (Absolute) 0.93 (L) 1.00 - 4.80 K/uL    Monos (Absolute) 0.68 0.00 - 0.85 K/uL    Eos (Absolute) 0.09 0.00 - 0.51 K/uL    Baso (Absolute) 0.03 0.00 - 0.12 K/uL    Immature Granulocytes (abs) 0.02 0.00 - 0.11 K/uL    NRBC (Absolute) 0.00 K/uL   BASIC METABOLIC PANEL    Collection Time: 01/17/25 10:50 AM   Result Value Ref Range    Sodium 143 135 - 145 mmol/L    Potassium 4.1 3.6 - 5.5 mmol/L    Chloride 109 96 - 112 mmol/L    Co2 26 20 - 33 mmol/L    Glucose 97 65 - 99 mg/dL    Bun 20 8 - 22 mg/dL    Creatinine 1.52 (H) 0.50 - 1.40 mg/dL    Calcium 9.3 8.5 - 10.5 mg/dL    Anion Gap 8.0 7.0 - 16.0   proBrain Natriuretic Peptide, NT    Collection Time: 01/17/25 10:50 AM   Result Value Ref Range    NT-proBNP 885 (H) 0 - 125 pg/mL   TROPONIN    Collection Time: 01/17/25 10:50 AM   Result Value Ref Range    Troponin T 14 6 - 19 ng/L   ESTIMATED GFR    Collection Time: 01/17/25 10:50 AM   Result Value  Ref Range    GFR (CKD-EPI) 47 (A) >60 mL/min/1.73 m 2       INITIAL ASSESSMENT AND PLAN    10:18 AM - Patient was evaluated at bedside for dysuria. 2+ bilateral lower extremity edema. Ordered for Urinalysis, culture, if indicated, Troponin, pBNP, BMP, and CBC w/ diff to evaluate. Patient verbalizes understanding and support with my plan of care.  Differential diagnoses include but not limited to: Urinary tract infection, urinary retention    11:17 AM - Post void residual is over 1,000.    12:16 PM - I reevaluated the patient at bedside. I discussed the patient's diagnostic study results which show no evidence of urinary tract infection.  Slightly elevated creatinine. I discussed plan for discharge and follow up as outlined below. The patient is stable for discharge at this time and will return for any new or worsening symptoms. Patient verbalizes understanding and support with my plan for discharge.        COURSE AND MEDICAL DECISION MAKING    Patient presents today with dysuria.  He has significant urinary retention.  Hong catheter placed.  This is alleviated.  I suspect the patient's renal function will likely improve after this urinary retention is resolved.  Patient was referred to the on-call urologist.  Started on Flomax.        DISPOSITION AND DISCUSSIONS    I have discussed management of the patient with the following physicians and RADHA's:  None    Discussion of management with other [facility] or appropriate source(s): None     The patient will return for new or worsening symptoms and is stable at the time of discharge.    The patient is referred to a primary physician for blood pressure management, diabetic screening, and for all other preventative health concerns.    DISPOSITION:  Patient will be discharged home in stable condition.    FOLLOW UP:  Pernell Shelton M.D.  [address] [place] [address] Dr Danilo PEDRAZA [id number] [phone]    Schedule an appointment as soon as possible for a visit       [facility],  Emergency Dept  [address] [facility] [address] [phone]    If symptoms worsen    Mary Esteban P.A.-C.  [address] [facility] [address]  [facility] [id number] [phone]    Schedule an appointment as soon as possible for a visit         OUTPATIENT MEDICATIONS:  New Prescriptions    TAMSULOSIN (FLOMAX) 0.4 MG CAPSULE    Take 1 Capsule by mouth every day.         FINAL DIAGNOSIS  1. Dysuria    2. Urinary retention         Erin CHAVEZ (Lorraine), am scribing for, and in the presence of, Joaquin Chamberlain M.D..    Electronically signed by: Erin Springer), 1/17/2025    Joaquin CHAVEZ M.D. personally performed the services described in this documentation, as scribed by Erin Alves in my presence, and it is both accurate and complete.      The note accurately reflects work and decisions made by me.  Joaquin Chamberlain M.D.  1/17/2025  2:34 PM

## 2025-01-17 NOTE — TELEPHONE ENCOUNTER
Chart prep:    Spoke with patient. Echo is scheduled a day after appointment with Dr. Phillips and lab work was not done. He will have the lab work done and wants to still see Dr. Phillips a day before his appointment because he wants to make sure  Wants him to do an echo.

## 2025-01-20 ENCOUNTER — PHARMACY VISIT (OUTPATIENT)
Dept: PHARMACY | Facility: MEDICAL CENTER | Age: 75
End: 2025-01-20
Payer: COMMERCIAL

## 2025-01-20 ENCOUNTER — OFFICE VISIT (OUTPATIENT)
Dept: UROLOGY | Facility: MEDICAL CENTER | Age: 75
End: 2025-01-20
Payer: MEDICARE

## 2025-01-20 DIAGNOSIS — N17.9 AKI (ACUTE KIDNEY INJURY) (HCC): ICD-10-CM

## 2025-01-20 DIAGNOSIS — R33.9 URINARY RETENTION: ICD-10-CM

## 2025-01-20 LAB
POC POST-VOID: 871 ML
POC PRE-VOID: NORMAL

## 2025-01-20 PROCEDURE — 99204 OFFICE O/P NEW MOD 45 MIN: CPT | Performed by: STUDENT IN AN ORGANIZED HEALTH CARE EDUCATION/TRAINING PROGRAM

## 2025-01-20 PROCEDURE — 51798 US URINE CAPACITY MEASURE: CPT | Performed by: STUDENT IN AN ORGANIZED HEALTH CARE EDUCATION/TRAINING PROGRAM

## 2025-01-20 PROCEDURE — RXMED WILLOW AMBULATORY MEDICATION CHARGE: Performed by: STUDENT IN AN ORGANIZED HEALTH CARE EDUCATION/TRAINING PROGRAM

## 2025-01-20 RX ORDER — SILODOSIN 4 MG/1
4 CAPSULE ORAL DAILY
Qty: 30 CAPSULE | Refills: 5 | Status: SHIPPED | OUTPATIENT
Start: 2025-01-20

## 2025-01-20 NOTE — PROGRESS NOTES
Subjective  Samuel Luong is a 74 y.o. male who presents today for evaluation of urinary retention.    The patient states he had never had any episodes of urinary retention prior to this. Over the last few months he had noted that he was peeing small amounts frequently, had not had relief with OTC supplements. Prior to his ER visit 1/17 he noted lower leg edema, worsening frequency. Patient noted to have JOCELYN with Cr 1.5.     Family History   Problem Relation Age of Onset    Non-contributory Mother     Non-contributory Father        Social History     Socioeconomic History    Marital status:      Spouse name: Not on file    Number of children: Not on file    Years of education: Not on file    Highest education level: Not on file   Occupational History    Not on file   Tobacco Use    Smoking status: Never    Smokeless tobacco: Never   Vaping Use    Vaping status: Never Used   Substance and Sexual Activity    Alcohol use: Yes     Comment: occ.    Drug use: Never    Sexual activity: Not on file   Other Topics Concern    Not on file   Social History Narrative    Not on file     Social Drivers of Health     Financial Resource Strain: Not on file   Food Insecurity: Not on file   Transportation Needs: Not on file   Physical Activity: Not on file   Stress: Not on file   Social Connections: Not on file   Intimate Partner Violence: Not on file   Housing Stability: Not on file       No past surgical history on file.    Past Medical History:   Diagnosis Date    Acute MI, anterolateral wall (HCC)     Robert-lateral-apical MI    CAD (coronary artery disease) 12/21/2011    Chest pain, unspecified     HTN (hypertension) 2/5/2013    Hyperlipidemia, mixed     Presence of drug coated stent in LAD coronary artery     2009 3.0 x 18mm drug eluting stent too proximal LAD       Current Outpatient Medications   Medication Sig Dispense Refill    Silodosin 4 MG Cap Take 4 mg by mouth every day at 6 PM. 30 Capsule 5    furosemide  (LASIX) 20 MG Tab Take 1 Tablet by mouth every day. 30 Tablet 0    potassium chloride (MICRO-K) 10 MEQ capsule Take 1 Capsule by mouth every day. 30 Capsule 0    rosuvastatin (CRESTOR) 40 MG tablet Take 1 Tablet by mouth every day. Please keep follow up appointment for further refills. Thank you! 90 Tablet 2    carvedilol (COREG) 3.125 MG Tab Take 1 Tablet by mouth 2 times a day with meals. 180 Tablet 3    ezetimibe (ZETIA) 10 MG Tab Take 1 Tablet by mouth every day. 90 Tablet 3    ciprofloxacin/dexamethasone (CIPRODEX) 0.3-0.1 % Suspension UTD      aspirin (ASA) 325 MG TABS Take 325 mg by mouth every day.       No current facility-administered medications for this visit.       Allergies   Allergen Reactions    Penicillins Rash    Statins [Hmg-Coa-R Inhibitors]      myalgia       Objective  There were no vitals taken for this visit.  Physical Exam  Constitutional:       Appearance: Normal appearance.   HENT:      Head: Normocephalic and atraumatic.   Eyes:      Extraocular Movements: Extraocular movements intact.      Conjunctiva/sclera: Conjunctivae normal.   Pulmonary:      Effort: No respiratory distress.   Musculoskeletal:      Cervical back: Normal range of motion.   Skin:     General: Skin is warm and dry.   Neurological:      General: No focal deficit present.      Mental Status: He is alert.   Psychiatric:         Mood and Affect: Mood normal.         Labs:   CBC   Lab Results   Component Value Date/Time    WBC 8.2 01/17/2025 1050    RBC 4.95 01/17/2025 1050    HEMOGLOBIN 15.4 01/17/2025 1050    HEMATOCRIT 46.8 01/17/2025 1050    MCV 94.5 01/17/2025 1050    MCH 31.1 01/17/2025 1050    MCHC 32.9 01/17/2025 1050    RDW 44.0 01/17/2025 1050    MPV 9.7 01/17/2025 1050    LYMPHOCYTES 11.40 (L) 01/17/2025 1050    LYMPHS 0.93 (L) 01/17/2025 1050    MONOCYTES 8.30 01/17/2025 1050    MONOS 0.68 01/17/2025 1050    EOSINOPHILS 1.10 01/17/2025 1050    EOS 0.09 01/17/2025 1050    BASOPHILS 0.40 01/17/2025 1050    BASO  0.03 01/17/2025 1050    NRBC 0.00 01/17/2025 1050       BMP   Lab Results   Component Value Date/Time    SODIUM 143 01/17/2025 1050    POTASSIUM 4.1 01/17/2025 1050    CHLORIDE 109 01/17/2025 1050    CO2 26 01/17/2025 1050    GLUCOSE 97 01/17/2025 1050    BUN 20 01/17/2025 1050    CREATININE 1.52 (H) 01/17/2025 1050    CALCIUM 9.3 01/17/2025 1050         Imaging:     none    Assessment  Urinary Retention  -Patient has not started the flomax that was prescribed in the ER, we discussed that without that medication there is a low chance of him successfully urinating on his own today if we remove the casanova. He would like to attempt a void trial regardless  -patient felt dizzy after taking tamsulosin after our initial visit this morning, will switch to silodosin  -patient was taught how to perform intermittent self catheterization, instructed to catheterize 5x/day    Plan    Problem List Items Addressed This Visit       Urinary retention    Relevant Orders    Basic Metabolic Panel    POCT Bladder Scan (Completed)    DME Other    JOCELYN (acute kidney injury) (HCC)     RTC 1 week to assess symptoms

## 2025-01-21 ENCOUNTER — PHARMACY VISIT (OUTPATIENT)
Dept: PHARMACY | Facility: MEDICAL CENTER | Age: 75
End: 2025-01-21
Payer: COMMERCIAL

## 2025-01-22 ENCOUNTER — TELEPHONE (OUTPATIENT)
Dept: UROLOGY | Facility: MEDICAL CENTER | Age: 75
End: 2025-01-22
Payer: MEDICARE

## 2025-01-22 NOTE — TELEPHONE ENCOUNTER
Samuel Luong  1950     patient to [facility] 5x daily due to chronic urinary retention. Using coude unable to pass straight catheter

## 2025-01-24 ENCOUNTER — HOSPITAL ENCOUNTER (OUTPATIENT)
Facility: MEDICAL CENTER | Age: 75
End: 2025-01-24
Attending: FAMILY MEDICINE
Payer: MEDICARE

## 2025-01-24 ENCOUNTER — HOSPITAL ENCOUNTER (OUTPATIENT)
Facility: MEDICAL CENTER | Age: 75
End: 2025-01-24
Payer: MEDICARE

## 2025-01-24 ENCOUNTER — HOSPITAL ENCOUNTER (OUTPATIENT)
Facility: MEDICAL CENTER | Age: 75
End: 2025-01-24
Attending: INTERNAL MEDICINE
Payer: MEDICARE

## 2025-01-24 DIAGNOSIS — N40.1 BENIGN PROSTATIC HYPERPLASIA WITH LOWER URINARY TRACT SYMPTOMS, SYMPTOM DETAILS UNSPECIFIED: ICD-10-CM

## 2025-01-24 DIAGNOSIS — I10 ESSENTIAL HYPERTENSION: ICD-10-CM

## 2025-01-24 DIAGNOSIS — E78.00 PURE HYPERCHOLESTEROLEMIA: ICD-10-CM

## 2025-01-24 DIAGNOSIS — R60.0 LOWER EXTREMITY EDEMA: ICD-10-CM

## 2025-01-24 DIAGNOSIS — E78.2 HYPERLIPIDEMIA, MIXED: ICD-10-CM

## 2025-01-24 DIAGNOSIS — Z11.59 NEED FOR HEPATITIS C SCREENING TEST: ICD-10-CM

## 2025-01-24 DIAGNOSIS — Z12.5 SCREENING PSA (PROSTATE SPECIFIC ANTIGEN): ICD-10-CM

## 2025-01-24 LAB
25(OH)D3 SERPL-MCNC: 33 NG/ML (ref 30–100)
ALBUMIN SERPL BCP-MCNC: 3.9 G/DL (ref 3.2–4.9)
ALBUMIN/GLOB SERPL: 1.4 G/DL
ALP SERPL-CCNC: 60 U/L (ref 30–99)
ALT SERPL-CCNC: 16 U/L (ref 2–50)
ANION GAP SERPL CALC-SCNC: 9 MMOL/L (ref 7–16)
AST SERPL-CCNC: 22 U/L (ref 12–45)
BASOPHILS # BLD AUTO: 0.4 % (ref 0–1.8)
BASOPHILS # BLD: 0.05 K/UL (ref 0–0.12)
BILIRUB SERPL-MCNC: 0.8 MG/DL (ref 0.1–1.5)
BUN SERPL-MCNC: 17 MG/DL (ref 8–22)
CALCIUM ALBUM COR SERPL-MCNC: 9.4 MG/DL (ref 8.5–10.5)
CALCIUM SERPL-MCNC: 9.3 MG/DL (ref 8.5–10.5)
CHLORIDE SERPL-SCNC: 106 MMOL/L (ref 96–112)
CHOLEST SERPL-MCNC: 105 MG/DL (ref 100–199)
CO2 SERPL-SCNC: 24 MMOL/L (ref 20–33)
CREAT SERPL-MCNC: 1.19 MG/DL (ref 0.5–1.4)
EOSINOPHIL # BLD AUTO: 0.14 K/UL (ref 0–0.51)
EOSINOPHIL NFR BLD: 1 % (ref 0–6.9)
ERYTHROCYTE [DISTWIDTH] IN BLOOD BY AUTOMATED COUNT: 45.3 FL (ref 35.9–50)
EST. AVERAGE GLUCOSE BLD GHB EST-MCNC: 126 MG/DL
FASTING STATUS PATIENT QL REPORTED: NORMAL
GFR SERPLBLD CREATININE-BSD FMLA CKD-EPI: 63 ML/MIN/1.73 M 2
GLOBULIN SER CALC-MCNC: 2.8 G/DL (ref 1.9–3.5)
GLUCOSE SERPL-MCNC: 108 MG/DL (ref 65–99)
HBA1C MFR BLD: 6 % (ref 4–5.6)
HCT VFR BLD AUTO: 46.3 % (ref 42–52)
HCV AB SER QL: NORMAL
HDLC SERPL-MCNC: 35 MG/DL
HGB BLD-MCNC: 14.9 G/DL (ref 14–18)
IMM GRANULOCYTES # BLD AUTO: 0.05 K/UL (ref 0–0.11)
IMM GRANULOCYTES NFR BLD AUTO: 0.4 % (ref 0–0.9)
LDLC SERPL CALC-MCNC: 53 MG/DL
LYMPHOCYTES # BLD AUTO: 1.16 K/UL (ref 1–4.8)
LYMPHOCYTES NFR BLD: 8.4 % (ref 22–41)
MCH RBC QN AUTO: 31 PG (ref 27–33)
MCHC RBC AUTO-ENTMCNC: 32.2 G/DL (ref 32.3–36.5)
MCV RBC AUTO: 96.5 FL (ref 81.4–97.8)
MONOCYTES # BLD AUTO: 1.32 K/UL (ref 0–0.85)
MONOCYTES NFR BLD AUTO: 9.6 % (ref 0–13.4)
NEUTROPHILS # BLD AUTO: 11.03 K/UL (ref 1.82–7.42)
NEUTROPHILS NFR BLD: 80.2 % (ref 44–72)
NRBC # BLD AUTO: 0 K/UL
NRBC BLD-RTO: 0 /100 WBC (ref 0–0.2)
PLATELET # BLD AUTO: 304 K/UL (ref 164–446)
PMV BLD AUTO: 9.7 FL (ref 9–12.9)
POTASSIUM SERPL-SCNC: 4.1 MMOL/L (ref 3.6–5.5)
PROT SERPL-MCNC: 6.7 G/DL (ref 6–8.2)
PSA SERPL DL<=0.01 NG/ML-MCNC: 3.63 NG/ML (ref 0–4)
RBC # BLD AUTO: 4.8 M/UL (ref 4.7–6.1)
SODIUM SERPL-SCNC: 139 MMOL/L (ref 135–145)
T3FREE SERPL-MCNC: 2.63 PG/ML (ref 2–4.4)
T4 FREE SERPL-MCNC: 1 NG/DL (ref 0.93–1.7)
TRIGL SERPL-MCNC: 84 MG/DL (ref 0–149)
TSH SERPL-ACNC: 1.97 UIU/ML (ref 0.35–5.5)
WBC # BLD AUTO: 13.8 K/UL (ref 4.8–10.8)

## 2025-01-24 PROCEDURE — 84439 ASSAY OF FREE THYROXINE: CPT

## 2025-01-24 PROCEDURE — 85025 COMPLETE CBC W/AUTO DIFF WBC: CPT

## 2025-01-24 PROCEDURE — 36415 COLL VENOUS BLD VENIPUNCTURE: CPT

## 2025-01-24 PROCEDURE — 80053 COMPREHEN METABOLIC PANEL: CPT

## 2025-01-24 PROCEDURE — 83036 HEMOGLOBIN GLYCOSYLATED A1C: CPT | Mod: GA

## 2025-01-24 PROCEDURE — 80061 LIPID PANEL: CPT

## 2025-01-24 PROCEDURE — 84481 FREE ASSAY (FT-3): CPT

## 2025-01-24 PROCEDURE — 84443 ASSAY THYROID STIM HORMONE: CPT

## 2025-01-24 PROCEDURE — 82306 VITAMIN D 25 HYDROXY: CPT | Mod: GA

## 2025-01-24 PROCEDURE — 84153 ASSAY OF PSA TOTAL: CPT

## 2025-01-24 PROCEDURE — 86803 HEPATITIS C AB TEST: CPT

## 2025-02-04 ENCOUNTER — OFFICE VISIT (OUTPATIENT)
Dept: CARDIOLOGY | Facility: MEDICAL CENTER | Age: 75
End: 2025-02-04
Attending: INTERNAL MEDICINE
Payer: MEDICARE

## 2025-02-04 VITALS
HEART RATE: 76 BPM | WEIGHT: 181 LBS | RESPIRATION RATE: 16 BRPM | BODY MASS INDEX: 23.23 KG/M2 | OXYGEN SATURATION: 98 % | HEIGHT: 74 IN | DIASTOLIC BLOOD PRESSURE: 78 MMHG | SYSTOLIC BLOOD PRESSURE: 138 MMHG

## 2025-02-04 DIAGNOSIS — I25.10 CORONARY ARTERY DISEASE DUE TO LIPID RICH PLAQUE: ICD-10-CM

## 2025-02-04 DIAGNOSIS — R33.8 ACUTE URINARY RETENTION: ICD-10-CM

## 2025-02-04 DIAGNOSIS — I10 ESSENTIAL HYPERTENSION: ICD-10-CM

## 2025-02-04 DIAGNOSIS — E78.2 MIXED HYPERLIPIDEMIA: ICD-10-CM

## 2025-02-04 DIAGNOSIS — I25.83 CORONARY ARTERY DISEASE DUE TO LIPID RICH PLAQUE: ICD-10-CM

## 2025-02-04 DIAGNOSIS — Z95.5 S/P DRUG ELUTING CORONARY STENT PLACEMENT: ICD-10-CM

## 2025-02-04 PROCEDURE — 99214 OFFICE O/P EST MOD 30 MIN: CPT | Performed by: INTERNAL MEDICINE

## 2025-02-04 PROCEDURE — 3078F DIAST BP <80 MM HG: CPT | Performed by: INTERNAL MEDICINE

## 2025-02-04 PROCEDURE — 3075F SYST BP GE 130 - 139MM HG: CPT | Performed by: INTERNAL MEDICINE

## 2025-02-04 PROCEDURE — 99213 OFFICE O/P EST LOW 20 MIN: CPT | Performed by: INTERNAL MEDICINE

## 2025-02-04 RX ORDER — CIPROFLOXACIN AND DEXAMETHASONE 3; 1 MG/ML; MG/ML
4 SUSPENSION/ DROPS AURICULAR (OTIC)
COMMUNITY
End: 2025-02-07

## 2025-02-04 RX ORDER — TAMSULOSIN HYDROCHLORIDE 0.4 MG/1
0.4 CAPSULE ORAL
COMMUNITY
End: 2025-02-07 | Stop reason: SDUPTHER

## 2025-02-04 RX ORDER — ROSUVASTATIN CALCIUM 40 MG/1
40 TABLET, COATED ORAL DAILY
COMMUNITY
End: 2025-02-07

## 2025-02-04 RX ORDER — NIACIN 500 MG
500 TABLET ORAL
COMMUNITY
End: 2025-02-07

## 2025-02-04 ASSESSMENT — FIBROSIS 4 INDEX: FIB4 SCORE: 1.375

## 2025-02-04 NOTE — PROGRESS NOTES
Subjective:   Samuel Luong is a 74 y.o. male who presents today for follow-up of CAD.  Has a history of mixed hyperlipidemia and mild hypertension and experienced anterolateral apical MI 2009 and received a SIMA to the proximal LAD. He has preserved left ventricular ejection fraction.     Since last visit he was seen for new onset lower extremity edema by our new APRN 2 weeks prior.  Other than edema he did not endorse significant symptoms at that visit and was scheduled for echocardiogram.  Subsequent to that visit and our visit today he was noted to have severe acute urinary retention with over 2 L of retained urine.  After addressing this his edema resolved and his acute kidney injury improved.  He has not undergone his echocardiogram yet and indeed is asymptomatic at this time.    Past Medical History:   Diagnosis Date    Acute MI, anterolateral wall (HCC)     Robert-lateral-apical MI    CAD (coronary artery disease) 12/21/2011    Chest pain, unspecified     HTN (hypertension) 2/5/2013    Hyperlipidemia, mixed     Presence of drug coated stent in LAD coronary artery     2009 3.0 x 18mm drug eluting stent too proximal LAD     No past surgical history on file.  Family History   Problem Relation Age of Onset    Non-contributory Mother     Non-contributory Father      Social History     Tobacco Use   Smoking Status Never   Smokeless Tobacco Never     Allergies   Allergen Reactions    Penicillins Rash    Statins [Hmg-Coa-R Inhibitors]      myalgia     Outpatient Encounter Medications as of 2/4/2025   Medication Sig Dispense Refill    ciprofloxacin/dexamethasone (CIPRODEX) 0.3-0.1 % Suspension 4 Drops.      tamsulosin (FLOMAX) 0.4 MG capsule Take 0.4 mg by mouth 1/2 hour after breakfast.      rosuvastatin (CRESTOR) 40 MG tablet Take 1 Tablet by mouth every day. Please keep follow up appointment for further refills. Thank you! 90 Tablet 2    carvedilol (COREG) 3.125 MG Tab Take 1 Tablet by mouth 2 times a day  "with meals. 180 Tablet 3    ezetimibe (ZETIA) 10 MG Tab Take 1 Tablet by mouth every day. 90 Tablet 3    aspirin (ASA) 325 MG TABS Take 325 mg by mouth every day.      niacin 500 MG Tab Take 500 mg by mouth. (Patient not taking: Reported on 2/4/2025)      rosuvastatin (CRESTOR) 40 MG tablet Take 40 mg by mouth every day.      Silodosin 4 MG Cap Take 4 mg by mouth every day at 6 PM. (Patient not taking: Reported on 2/4/2025) 30 Capsule 5    [DISCONTINUED] tamsulosin (FLOMAX) 0.4 MG capsule Take 1 Capsule by mouth every day. 30 Capsule 0    furosemide (LASIX) 20 MG Tab Take 1 Tablet by mouth every day. (Patient not taking: Reported on 2/4/2025) 30 Tablet 0    potassium chloride (MICRO-K) 10 MEQ capsule Take 1 Capsule by mouth every day. (Patient not taking: Reported on 2/4/2025) 30 Capsule 0    ciprofloxacin/dexamethasone (CIPRODEX) 0.3-0.1 % Suspension UTD       No facility-administered encounter medications on file as of 2/4/2025.     Review of Systems   All other systems reviewed and are negative.       Objective:   /78 (BP Location: Left arm, Patient Position: Sitting)   Pulse 76   Resp 16   Ht 1.88 m (6' 2\")   Wt 82.1 kg (181 lb)   SpO2 98%   BMI 23.24 kg/m²     Physical Exam  Constitutional:       General: He is not in acute distress.     Appearance: He is well-developed. He is not diaphoretic.      Comments: Tall thin and athletic appearing   HENT:      Head: Normocephalic and atraumatic.   Eyes:      General: No scleral icterus.     Conjunctiva/sclera: Conjunctivae normal.      Pupils: Pupils are equal, round, and reactive to light.   Neck:      Thyroid: No thyromegaly.      Vascular: No JVD.      Trachea: No tracheal deviation.   Cardiovascular:      Rate and Rhythm: Normal rate and regular rhythm.      Chest Wall: PMI is not displaced.      Pulses:           Carotid pulses are 2+ on the right side and 2+ on the left side.       Radial pulses are 2+ on the right side and 2+ on the left side.       "  Dorsalis pedis pulses are 2+ on the right side and 2+ on the left side.        Posterior tibial pulses are 2+ on the right side and 2+ on the left side.      Heart sounds: Normal heart sounds and S1 normal. No murmur heard.     No friction rub. No gallop.   Pulmonary:      Effort: Pulmonary effort is normal.      Breath sounds: Normal breath sounds. No wheezing or rales.   Abdominal:      General: Bowel sounds are normal. There is no distension.      Palpations: Abdomen is soft. There is no pulsatile mass.      Tenderness: There is no abdominal tenderness. There is no guarding.   Skin:     General: Skin is warm and dry.      Findings: No erythema or rash.   Neurological:      Mental Status: He is alert and oriented to person, place, and time.      Cranial Nerves: No cranial nerve deficit (cranial nerves II through XII grossly intact).   Psychiatric:         Behavior: Behavior normal.         Thought Content: Thought content normal.         LABS:  Lab Results   Component Value Date/Time    CHOLSTRLTOT 105 01/24/2025 08:00 AM    LDL 53 01/24/2025 08:00 AM    HDL 35 (A) 01/24/2025 08:00 AM    TRIGLYCERIDE 84 01/24/2025 08:00 AM       Lab Results   Component Value Date/Time    WBC 13.8 (H) 01/24/2025 08:00 AM    RBC 4.80 01/24/2025 08:00 AM    HEMOGLOBIN 14.9 01/24/2025 08:00 AM    HEMATOCRIT 46.3 01/24/2025 08:00 AM    MCV 96.5 01/24/2025 08:00 AM    NEUTSPOLYS 80.20 (H) 01/24/2025 08:00 AM    LYMPHOCYTES 8.40 (L) 01/24/2025 08:00 AM    MONOCYTES 9.60 01/24/2025 08:00 AM    EOSINOPHILS 1.00 01/24/2025 08:00 AM    BASOPHILS 0.40 01/24/2025 08:00 AM     Lab Results   Component Value Date/Time    SODIUM 139 01/24/2025 08:00 AM    POTASSIUM 4.1 01/24/2025 08:00 AM    CHLORIDE 106 01/24/2025 08:00 AM    CO2 24 01/24/2025 08:00 AM    GLUCOSE 108 (H) 01/24/2025 08:00 AM    BUN 17 01/24/2025 08:00 AM    CREATININE 1.19 01/24/2025 08:00 AM    CREATININE 1.05 01/22/2013 06:39 AM    BUNCREATRAT 13 08/14/2014 06:50 AM     "BUNCREATRAT 14 01/22/2013 06:39 AM     Lab Results   Component Value Date    HBA1C 6.0 (H) 01/24/2025      Lab Results   Component Value Date/Time    ALKPHOSPHAT 60 01/24/2025 08:00 AM    ASTSGOT 22 01/24/2025 08:00 AM    ALTSGPT 16 01/24/2025 08:00 AM    TBILIRUBIN 0.8 01/24/2025 08:00 AM      No results found for: \"BNPBTYPENAT\"   No results found for: \"TSH\"  No results found for: \"PROTHROMBTM\", \"INR\"       Assessment:     1. Essential hypertension        2. Hyperlipidemia, mixed        3. Coronary artery disease due to lipid rich plaque        4. S/P drug eluting coronary stent placement        5. Acute urinary retention            Medical Decision Making:  Today's Assessment / Status / Plan:     Doing well.  LDL less than 70 closer to 50.  Blood pressure well-controlled.  Swelling was related to acute renal failure secondary to acute urinary retention and is now resolved after addressing the underlying cause.  They have not completed the echocardiogram.  Unless he has recurrent symptoms or other issues he can defer this test for the time being which he would prefer.  Continue other medical therapy and follow-up routinely.      "

## 2025-02-05 ENCOUNTER — APPOINTMENT (OUTPATIENT)
Dept: CARDIOLOGY | Facility: MEDICAL CENTER | Age: 75
End: 2025-02-05
Attending: INTERNAL MEDICINE
Payer: MEDICARE

## 2025-02-07 ENCOUNTER — OFFICE VISIT (OUTPATIENT)
Dept: UROLOGY | Facility: MEDICAL CENTER | Age: 75
End: 2025-02-07
Payer: MEDICARE

## 2025-02-07 DIAGNOSIS — R33.9 URINARY RETENTION: ICD-10-CM

## 2025-02-07 DIAGNOSIS — N17.9 AKI (ACUTE KIDNEY INJURY) (HCC): ICD-10-CM

## 2025-02-07 PROCEDURE — 99213 OFFICE O/P EST LOW 20 MIN: CPT | Performed by: STUDENT IN AN ORGANIZED HEALTH CARE EDUCATION/TRAINING PROGRAM

## 2025-02-07 RX ORDER — TAMSULOSIN HYDROCHLORIDE 0.4 MG/1
0.4 CAPSULE ORAL
Qty: 30 CAPSULE | Refills: 6 | Status: SHIPPED | OUTPATIENT
Start: 2025-02-07 | End: 2025-02-10

## 2025-02-07 NOTE — PROGRESS NOTES
Subjective  Samuel Luong is a 74 y.o. male who presents today for follow up of urinary retention, BPH/LUTS.    The patient has been performing CIC 3x/day, typically getting volumes <600 cc with a handful of caths returning 900-1000 cc. He is starting to feel a sensation to void, voiding small amounts on his own, but still having to cath to empty. He is tolerating tamsulosin well.    Family History   Problem Relation Age of Onset    Non-contributory Mother     Non-contributory Father        Social History     Socioeconomic History    Marital status:      Spouse name: Not on file    Number of children: Not on file    Years of education: Not on file    Highest education level: Not on file   Occupational History    Not on file   Tobacco Use    Smoking status: Never    Smokeless tobacco: Never   Vaping Use    Vaping status: Never Used   Substance and Sexual Activity    Alcohol use: Yes     Comment: occ.    Drug use: Never    Sexual activity: Not on file   Other Topics Concern    Not on file   Social History Narrative    Not on file     Social Drivers of Health     Financial Resource Strain: Not on file   Food Insecurity: Not on file   Transportation Needs: Not on file   Physical Activity: Not on file   Stress: Not on file   Social Connections: Not on file   Intimate Partner Violence: Not on file   Housing Stability: Not on file       No past surgical history on file.    Past Medical History:   Diagnosis Date    Acute MI, anterolateral wall (HCC)     Robert-lateral-apical MI    CAD (coronary artery disease) 12/21/2011    Chest pain, unspecified     HTN (hypertension) 2/5/2013    Hyperlipidemia, mixed     Presence of drug coated stent in LAD coronary artery     2009 3.0 x 18mm drug eluting stent too proximal LAD       Current Outpatient Medications   Medication Sig Dispense Refill    tamsulosin (FLOMAX) 0.4 MG capsule Take 1 Capsule by mouth 1/2 hour after breakfast. 30 Capsule 6    rosuvastatin (CRESTOR) 40  MG tablet Take 1 Tablet by mouth every day. Please keep follow up appointment for further refills. Thank you! 90 Tablet 2    carvedilol (COREG) 3.125 MG Tab Take 1 Tablet by mouth 2 times a day with meals. 180 Tablet 3    ezetimibe (ZETIA) 10 MG Tab Take 1 Tablet by mouth every day. 90 Tablet 3    ciprofloxacin/dexamethasone (CIPRODEX) 0.3-0.1 % Suspension UTD      aspirin (ASA) 325 MG TABS Take 325 mg by mouth every day.      furosemide (LASIX) 20 MG Tab Take 1 Tablet by mouth every day. (Patient not taking: Reported on 2/7/2025) 30 Tablet 0    potassium chloride (MICRO-K) 10 MEQ capsule Take 1 Capsule by mouth every day. (Patient not taking: Reported on 2/4/2025) 30 Capsule 0     No current facility-administered medications for this visit.       Allergies   Allergen Reactions    Penicillins Rash    Statins [Hmg-Coa-R Inhibitors]      myalgia       Objective  There were no vitals taken for this visit.  Physical Exam  Constitutional:       Appearance: Normal appearance.   HENT:      Head: Normocephalic and atraumatic.   Eyes:      Extraocular Movements: Extraocular movements intact.      Conjunctiva/sclera: Conjunctivae normal.   Pulmonary:      Effort: No respiratory distress.   Musculoskeletal:      Cervical back: Normal range of motion.   Skin:     General: Skin is warm and dry.   Neurological:      General: No focal deficit present.      Mental Status: He is alert.   Psychiatric:         Mood and Affect: Mood normal.         Labs:   CBC   Lab Results   Component Value Date/Time    WBC 13.8 (H) 01/24/2025 0800    RBC 4.80 01/24/2025 0800    HEMOGLOBIN 14.9 01/24/2025 0800    HEMATOCRIT 46.3 01/24/2025 0800    MCV 96.5 01/24/2025 0800    MCH 31.0 01/24/2025 0800    MCHC 32.2 (L) 01/24/2025 0800    RDW 45.3 01/24/2025 0800    MPV 9.7 01/24/2025 0800    LYMPHOCYTES 8.40 (L) 01/24/2025 0800    LYMPHS 1.16 01/24/2025 0800    MONOCYTES 9.60 01/24/2025 0800    MONOS 1.32 (H) 01/24/2025 0800    EOSINOPHILS 1.00  01/24/2025 0800    EOS 0.14 01/24/2025 0800    BASOPHILS 0.40 01/24/2025 0800    BASO 0.05 01/24/2025 0800    NRBC 0.00 01/24/2025 0800       BMP   Lab Results   Component Value Date/Time    SODIUM 139 01/24/2025 0800    POTASSIUM 4.1 01/24/2025 0800    CHLORIDE 106 01/24/2025 0800    CO2 24 01/24/2025 0800    GLUCOSE 108 (H) 01/24/2025 0800    BUN 17 01/24/2025 0800    CREATININE 1.19 01/24/2025 0800    CALCIUM 9.3 01/24/2025 0800           Imaging:     none    Assessment    Urinary Retention, LUTS  -continue tamuslosin, needs refills  -continue CIC 3x/day, we discussed that if he is consistently getting volumes >600 cc that he will need to add an additional cath per day  -we will reassess his symptoms in one month, if he is still not urinating independently we can consider urodynamics    JOCELYN  -Cr has normalized with consistent bladder drainage    Plan    Problem List Items Addressed This Visit       Urinary retention    JOCELYN (acute kidney injury) (HCC)     RTC 4 weeks with PVR

## 2025-02-09 DIAGNOSIS — R60.0 LOWER EXTREMITY EDEMA: ICD-10-CM

## 2025-02-10 RX ORDER — FUROSEMIDE 20 MG/1
20 TABLET ORAL DAILY
Qty: 90 TABLET | Refills: 1 | Status: SHIPPED | OUTPATIENT
Start: 2025-02-10 | End: 2025-02-12

## 2025-02-10 RX ORDER — POTASSIUM CHLORIDE 750 MG/1
10 CAPSULE, EXTENDED RELEASE ORAL DAILY
Qty: 90 CAPSULE | Refills: 1 | Status: SHIPPED | OUTPATIENT
Start: 2025-02-10 | End: 2025-02-12

## 2025-02-10 RX ORDER — TAMSULOSIN HYDROCHLORIDE 0.4 MG/1
0.4 CAPSULE ORAL
Qty: 90 CAPSULE | Refills: 3 | Status: SHIPPED | OUTPATIENT
Start: 2025-02-10

## 2025-02-10 SDOH — ECONOMIC STABILITY: INCOME INSECURITY: IN THE LAST 12 MONTHS, WAS THERE A TIME WHEN YOU WERE NOT ABLE TO PAY THE MORTGAGE OR RENT ON TIME?: NO

## 2025-02-10 SDOH — ECONOMIC STABILITY: HOUSING INSECURITY
IN THE LAST 12 MONTHS, WAS THERE A TIME WHEN YOU DID NOT HAVE A STEADY PLACE TO SLEEP OR SLEPT IN A SHELTER (INCLUDING NOW)?: NO

## 2025-02-10 SDOH — ECONOMIC STABILITY: TRANSPORTATION INSECURITY
IN THE PAST 12 MONTHS, HAS THE LACK OF TRANSPORTATION KEPT YOU FROM MEDICAL APPOINTMENTS OR FROM GETTING MEDICATIONS?: NO

## 2025-02-10 SDOH — HEALTH STABILITY: MENTAL HEALTH
STRESS IS WHEN SOMEONE FEELS TENSE, NERVOUS, ANXIOUS, OR CAN'T SLEEP AT NIGHT BECAUSE THEIR MIND IS TROUBLED. HOW STRESSED ARE YOU?: NOT AT ALL

## 2025-02-10 SDOH — ECONOMIC STABILITY: INCOME INSECURITY: HOW HARD IS IT FOR YOU TO PAY FOR THE VERY BASICS LIKE FOOD, HOUSING, MEDICAL CARE, AND HEATING?: NOT HARD AT ALL

## 2025-02-10 SDOH — ECONOMIC STABILITY: FOOD INSECURITY: WITHIN THE PAST 12 MONTHS, THE FOOD YOU BOUGHT JUST DIDN'T LAST AND YOU DIDN'T HAVE MONEY TO GET MORE.: NEVER TRUE

## 2025-02-10 SDOH — HEALTH STABILITY: PHYSICAL HEALTH: ON AVERAGE, HOW MANY DAYS PER WEEK DO YOU ENGAGE IN MODERATE TO STRENUOUS EXERCISE (LIKE A BRISK WALK)?: 5 DAYS

## 2025-02-10 SDOH — ECONOMIC STABILITY: FOOD INSECURITY: WITHIN THE PAST 12 MONTHS, YOU WORRIED THAT YOUR FOOD WOULD RUN OUT BEFORE YOU GOT MONEY TO BUY MORE.: NEVER TRUE

## 2025-02-10 SDOH — HEALTH STABILITY: PHYSICAL HEALTH: ON AVERAGE, HOW MANY MINUTES DO YOU ENGAGE IN EXERCISE AT THIS LEVEL?: 20 MIN

## 2025-02-10 SDOH — ECONOMIC STABILITY: TRANSPORTATION INSECURITY
IN THE PAST 12 MONTHS, HAS LACK OF RELIABLE TRANSPORTATION KEPT YOU FROM MEDICAL APPOINTMENTS, MEETINGS, WORK OR FROM GETTING THINGS NEEDED FOR DAILY LIVING?: NO

## 2025-02-10 SDOH — ECONOMIC STABILITY: TRANSPORTATION INSECURITY
IN THE PAST 12 MONTHS, HAS LACK OF TRANSPORTATION KEPT YOU FROM MEETINGS, WORK, OR FROM GETTING THINGS NEEDED FOR DAILY LIVING?: NO

## 2025-02-10 ASSESSMENT — SOCIAL DETERMINANTS OF HEALTH (SDOH)
DO YOU BELONG TO ANY CLUBS OR ORGANIZATIONS SUCH AS CHURCH GROUPS UNIONS, FRATERNAL OR ATHLETIC GROUPS, OR SCHOOL GROUPS?: YES
HOW OFTEN DO YOU HAVE A DRINK CONTAINING ALCOHOL: MONTHLY OR LESS
HOW OFTEN DO YOU ATTENT MEETINGS OF THE CLUB OR ORGANIZATION YOU BELONG TO?: MORE THAN 4 TIMES PER YEAR
IN THE PAST 12 MONTHS, HAS THE ELECTRIC, GAS, OIL, OR WATER COMPANY THREATENED TO SHUT OFF SERVICE IN YOUR HOME?: NO
HOW OFTEN DO YOU ATTEND CHURCH OR RELIGIOUS SERVICES?: MORE THAN 4 TIMES PER YEAR
HOW OFTEN DO YOU ATTENT MEETINGS OF THE CLUB OR ORGANIZATION YOU BELONG TO?: MORE THAN 4 TIMES PER YEAR
HOW OFTEN DO YOU GET TOGETHER WITH FRIENDS OR RELATIVES?: MORE THAN THREE TIMES A WEEK
IN A TYPICAL WEEK, HOW MANY TIMES DO YOU TALK ON THE PHONE WITH FAMILY, FRIENDS, OR NEIGHBORS?: MORE THAN THREE TIMES A WEEK
HOW OFTEN DO YOU GET TOGETHER WITH FRIENDS OR RELATIVES?: MORE THAN THREE TIMES A WEEK
IN A TYPICAL WEEK, HOW MANY TIMES DO YOU TALK ON THE PHONE WITH FAMILY, FRIENDS, OR NEIGHBORS?: MORE THAN THREE TIMES A WEEK
HOW OFTEN DO YOU HAVE SIX OR MORE DRINKS ON ONE OCCASION: NEVER
HOW MANY DRINKS CONTAINING ALCOHOL DO YOU HAVE ON A TYPICAL DAY WHEN YOU ARE DRINKING: PATIENT DOES NOT DRINK
HOW HARD IS IT FOR YOU TO PAY FOR THE VERY BASICS LIKE FOOD, HOUSING, MEDICAL CARE, AND HEATING?: NOT HARD AT ALL
DO YOU BELONG TO ANY CLUBS OR ORGANIZATIONS SUCH AS CHURCH GROUPS UNIONS, FRATERNAL OR ATHLETIC GROUPS, OR SCHOOL GROUPS?: YES
WITHIN THE PAST 12 MONTHS, YOU WORRIED THAT YOUR FOOD WOULD RUN OUT BEFORE YOU GOT THE MONEY TO BUY MORE: NEVER TRUE
HOW OFTEN DO YOU ATTEND CHURCH OR RELIGIOUS SERVICES?: MORE THAN 4 TIMES PER YEAR

## 2025-02-10 ASSESSMENT — LIFESTYLE VARIABLES
HOW OFTEN DO YOU HAVE A DRINK CONTAINING ALCOHOL: MONTHLY OR LESS
SKIP TO QUESTIONS 9-10: 1
AUDIT-C TOTAL SCORE: 1
HOW MANY STANDARD DRINKS CONTAINING ALCOHOL DO YOU HAVE ON A TYPICAL DAY: PATIENT DOES NOT DRINK
HOW OFTEN DO YOU HAVE SIX OR MORE DRINKS ON ONE OCCASION: NEVER

## 2025-02-10 NOTE — TELEPHONE ENCOUNTER
Received request via: Pharmacy    Was the patient seen in the last year in this department? Yes    Does the patient have an active prescription (recently filled or refills available) for medication(s) requested? No    Pharmacy Name: [id number] - [place], [place] - [address]  AT [facility]     Does the patient have intermediate Plus and need 100-day supply? (This applies to ALL medications) Patient does not have SCP

## 2025-02-11 NOTE — TELEPHONE ENCOUNTER
"Per TW's last OV note, \"Continue other medical therapy and follow-up routinely.\"      Labs reviewed.         "

## 2025-02-12 ENCOUNTER — OFFICE VISIT (OUTPATIENT)
Dept: MEDICAL GROUP | Age: 75
End: 2025-02-12
Payer: MEDICARE

## 2025-02-12 VITALS
BODY MASS INDEX: 24.65 KG/M2 | HEART RATE: 77 BPM | WEIGHT: 182 LBS | SYSTOLIC BLOOD PRESSURE: 140 MMHG | TEMPERATURE: 97.2 F | OXYGEN SATURATION: 97 % | HEIGHT: 72 IN | DIASTOLIC BLOOD PRESSURE: 76 MMHG

## 2025-02-12 DIAGNOSIS — E78.2 MIXED HYPERLIPIDEMIA: ICD-10-CM

## 2025-02-12 DIAGNOSIS — R33.9 URINARY RETENTION: ICD-10-CM

## 2025-02-12 DIAGNOSIS — I10 ESSENTIAL HYPERTENSION: ICD-10-CM

## 2025-02-12 DIAGNOSIS — Z09 HOSPITAL DISCHARGE FOLLOW-UP: ICD-10-CM

## 2025-02-12 DIAGNOSIS — Z00.00 MEDICARE ANNUAL WELLNESS VISIT, INITIAL: ICD-10-CM

## 2025-02-12 PROCEDURE — 99214 OFFICE O/P EST MOD 30 MIN: CPT | Performed by: FAMILY MEDICINE

## 2025-02-12 PROCEDURE — G0439 PPPS, SUBSEQ VISIT: HCPCS | Mod: 25 | Performed by: FAMILY MEDICINE

## 2025-02-12 PROCEDURE — 3078F DIAST BP <80 MM HG: CPT | Performed by: FAMILY MEDICINE

## 2025-02-12 PROCEDURE — 3077F SYST BP >= 140 MM HG: CPT | Performed by: FAMILY MEDICINE

## 2025-02-12 RX ORDER — TAMSULOSIN HYDROCHLORIDE 0.4 MG/1
0.8 CAPSULE ORAL
Qty: 180 CAPSULE | Refills: 2 | Status: SHIPPED | OUTPATIENT
Start: 2025-02-12

## 2025-02-12 ASSESSMENT — ENCOUNTER SYMPTOMS: GENERAL WELL-BEING: GOOD

## 2025-02-12 ASSESSMENT — FIBROSIS 4 INDEX: FIB4 SCORE: 1.375

## 2025-02-12 ASSESSMENT — PATIENT HEALTH QUESTIONNAIRE - PHQ9: CLINICAL INTERPRETATION OF PHQ2 SCORE: 0

## 2025-02-12 ASSESSMENT — ACTIVITIES OF DAILY LIVING (ADL): BATHING_REQUIRES_ASSISTANCE: 0

## 2025-02-12 NOTE — PROGRESS NOTES
HPI:    Samuel Luong is a 74 y.o. here today for a Medicare Annual Wellness Visit.       History of Present Illness  The patient is a 74-year-old male who presents for evaluation of urinary retention.    He sought emergency care on 01/17/2025 due to urinary retention, which necessitated the removal of 2.1 liters of fluid. A catheter was inserted for a duration of 5 days. Despite the initiation of Flomax therapy on the same day, he continues to experience minimal urination and persistent urge to urinate. He has been self-catheterizing 2 to 3 times daily, yielding approximately 450 mL of urine per session. He reports recent urinary urges and some urinary passage 3 to 4 days ago. He is currently under the care of a urologist, with a follow-up appointment scheduled for 03/07/2025. He recalls a previous incident in 2018 where tamsulosin was recommended, but it resulted in dizziness and a subsequent fall, causing nasal trauma and respiratory distress. He also reports a history of bladder examination via cystoscopy in 2018, which revealed no abnormalities.    He also reports a recent episode of pedal edema, which has since resolved. A cardiology consultation revealed no significant findings, and laboratory results were within normal limits. He maintains an active lifestyle through regular exercise.        IMMUNIZATIONS  He refuses vaccines.        Patient Active Problem List    Diagnosis Date Noted    Urinary retention 01/20/2025    JOCELYN (acute kidney injury) (HCC) 01/20/2025    CAD s/p SIMA LAD 2009 05/26/2017    Screening PSA (prostate specific antigen) 08/30/2016    Essential hypertension 02/05/2013    CAD (coronary artery disease) 12/21/2011    Acute MI, anterolateral wall (HCC)     Presence of drug coated stent in LAD coronary artery     Chest pain     Hyperlipidemia, mixed             Current supplements as per medication list.         Allergies: Penicillins and Statins [hmg-coa-r inhibitors]          Current social contact/activities: MON- WEIGHTS; TUES AND WED- STATIONARY BIKE; THURS AND SAT GOLF AND WALKING; AND FRI AND SUN RESTING DAYS         He  reports that he has never smoked. He has never used smokeless tobacco. He reports current alcohol use. He reports that he does not use drugs.         Lives with at home: WIFE AND 2 CHILDREN    Any caregivers: NO    Is the caregiver paid or unpaid: NO         ROS:     Gait: Uses no assistive device    Ostomy: No    Other tubes: No    Amputations: No    Chronic oxygen use: No    Last eye exam: 2023    Wears hearing aids: No    : Denies any urinary leakage during the last 6 months         Depression Screening  Little interest or pleasure in doing things?  0 - not at all  Feeling down, depressed , or hopeless? 0 - not at all  Trouble falling or staying asleep, or sleeping too much?     Feeling tired or having little energy?     Poor appetite or overeating?     Feeling bad about yourself - or that you are a failure or have let yourself or your family down?    Trouble concentrating on things, such as reading the newspaper or watching television?    Moving or speaking so slowly that other people could have noticed.  Or the opposite - being so fidgety or restless that you have been moving around a lot more than usual?     Thoughts that you would be better off dead, or of hurting yourself?     Patient Health Questionnaire Score:      If depressive symptoms identified deferred to follow up visit unless specifically addressed in assessment and plan.    Interpretation of PHQ-9 Total Score   Score Severity   1-4 No Depression   5-9 Mild Depression   10-14 Moderate Depression   15-19 Moderately Severe Depression   20-27 Severe Depression    Screening for Cognitive Impairment  Do you or any of your friends or family members have any concern about your memory? No  Three Minute Recall (Leader, Season, Table) 3/3    Lino clock face with all 12 numbers and set the hands to show  10 minutes after 11.  Yes    Cognitive concerns identified deferred for follow up unless specifically addressed in assessment and plan.    Fall Risk Assessment  Has the patient had two or more falls in the last year or any fall with injury in the last year?  No    Safety Assessment  Do you always wear your seatbelt?  Yes  Any changes to home needed to function safely? No  Difficulty hearing.  Yes  Patient counseled about all safety risks that were identified.    Functional Assessment ADLs  Are there any barriers preventing you from cooking for yourself or meeting nutritional needs?  No.    Are there any barriers preventing you from driving safely or obtaining transportation?  No.    Are there any barriers preventing you from using a telephone or calling for help?  No    Are there any barriers preventing you from shopping?  No.    Are there any barriers preventing you from taking care of your own finances?  No    Are there any barriers preventing you from managing your medications?  No    Are there any barriers preventing you from showering, bathing or dressing yourself? No    Are there any barriers preventing you from doing housework or laundry? No    Are there any barriers preventing you from using the toilet?No    Are you currently engaging in any exercise or physical activity?  Yes.      Self-Assessment of Health  What is your perception of your health? Good    Do you sleep more than six hours a night? Yes    In the past 7 days, how much did pain keep you from doing your normal work? None    Do you spend quality time with family or friends (virtually or in person)? Yes    Do you usually eat a heart healthy diet that constists of a variety of fruits, vegetables, whole grains and fiber? Yes    Do you eat foods high in fat and/or Fast Food more than three times per week? No    How concerned are you that your medical conditions are not being well managed? Not at all    Are you worried that in the next 2 months, you  may not have stable housing that you own, rent, or stay in as part of a household? No        Advance Care Planning  Do you have an Advance Directive, Living Will, Durable Power of , or POLST? Yes    Living Will     is not on file - instructed patient to bring in a copy to scan into their chart      Health Maintenance Summary            Current Care Gaps       Zoster (Shingles) Vaccines (1 of 2) Never done     No completion history exists for this topic.              COVID-19 Vaccine (1 - 2024-25 season) Never done     No completion history exists for this topic.                      Needs Review       Hepatitis C Screening  Tentatively Complete      01/24/2025  Hepatitis C Antibody component of HEP C VIRUS ANTIBODY                      Upcoming       Influenza Vaccine (1) Postponed until 2/12/2026     No completion history exists for this topic.              IMM DTaP/Tdap/Td Vaccine (1 - Tdap) Postponed until 2/12/2026     No completion history exists for this topic.              Pneumococcal Vaccine: 50+ Years (1 of 1 - PCV) Postponed until 2/12/2026     No completion history exists for this topic.              Annual Wellness Visit (Yearly) Next due on 2/12/2026 02/12/2025  Level of Service: [place] ANNUAL WELLNESS VISIT-INCLUDES PPPS SUBSEQUE*    02/12/2025  Visit Dx: Medicare annual wellness visit, initial                      Completed or No Longer Recommended       Hepatitis A Vaccine (Hep A) (Series Information) Aged Out     No completion history exists for this topic.              Hepatitis B Vaccine (Hep B) (Series Information) Aged Out     No completion history exists for this topic.              HPV Vaccines (Series Information) Aged Out     No completion history exists for this topic.              Polio Vaccine (Inactivated Polio) (Series Information) Aged Out     No completion history exists for this topic.              Meningococcal Immunization (Series Information) Aged Out     No completion  history exists for this topic.                            Patient Care Team:  Pernell Shelton M.D. as PCP - General (Family Medicine)           Patient Care Team:  Pernell Shelton M.D. as PCP - General (Family Medicine)         Social History     Tobacco Use    Smoking status: Never    Smokeless tobacco: Never   Vaping Use    Vaping status: Never Used   Substance Use Topics    Alcohol use: Yes     Comment: occ.    Drug use: Never            Family History   Problem Relation Age of Onset    Non-contributory Mother     Non-contributory Father             History reviewed. No pertinent surgical history.         Whisper test - stand 3 feet  behind patient and whisper 3 numbers for each ear while covering other ear..    - Right 3/3, Left 3/3         Vision test - using Snellen eye chart test both eyes separately and together, with and without correction.     - Without correction:      OD  20/20, OS 20/0, OU 20/13    Rise and walk test - Have patient stand, walk 10 feet and return to chair.    -  10 seconds         Stay independent checklist:    Yes (2) No (0) I have fallen in the past year. 0    Yes (2) No (0) I use or have been advised to use a cane or    walker to get around safely.0    Yes (1) No (0) Sometimes I feel unsteady when I am walking. 0    Yes (1) No (0) I steady myself by holding onto furniture    when walking at home.0    Yes (1) No (0) I am worried about falling0    Yes (1) No (0) I need to push with my hands to stand up    from a chair.0    Yes (1) No (0) I have some trouble stepping up onto a curb. 0    Yes (1) No (0) I often have to rush to the toilet. 0    Yes (1) No (0) I have lost some feeling in my feet. 0    Yes (1) No (0) I take medicine that sometimes makes me feel    light-headed or more tired than usual.0    Yes (1) No (0) I take medicine to help me sleep or improve    my mood. 1    Yes (1) No (0) I often feel sad or depressed. 0    Total Add up the number of points for each “yes” answer.  "1         EXAM    BP (!) 140/76 (BP Location: Left arm, Patient Position: Sitting, BP Cuff Size: Large adult)   Pulse 77   Temp 36.2 °C (97.2 °F) (Temporal)   Ht 1.816 m (5' 11.5\")   Wt 82.6 kg (182 lb)   SpO2 97%  -        Hearing good.     Dentition good    Alert, oriented in no acute distress.    Eye contact is good, speech goal directed, affect calm              Health maintenance review: (catch them up on all care gaps - vaccines, mammogram, colon cancer screen)    Health Maintenance Summary            Current Care Gaps       Zoster (Shingles) Vaccines (1 of 2) Never done     No completion history exists for this topic.              COVID-19 Vaccine (1 - 2024-25 season) Never done     No completion history exists for this topic.                      Needs Review       Hepatitis C Screening  Tentatively Complete      01/24/2025  Hepatitis C Antibody component of HEP C VIRUS ANTIBODY                      Upcoming       Influenza Vaccine (1) Postponed until 2/12/2026     No completion history exists for this topic.              IMM DTaP/Tdap/Td Vaccine (1 - Tdap) Postponed until 2/12/2026     No completion history exists for this topic.              Pneumococcal Vaccine: 50+ Years (1 of 1 - PCV) Postponed until 2/12/2026     No completion history exists for this topic.              Annual Wellness Visit (Yearly) Next due on 2/12/2026 02/12/2025  Level of Service: [place] ANNUAL WELLNESS VISIT-INCLUDES PPPS SUBSEQUE*    02/12/2025  Visit Dx: Medicare annual wellness visit, initial                      Completed or No Longer Recommended       Hepatitis A Vaccine (Hep A) (Series Information) Aged Out     No completion history exists for this topic.              Hepatitis B Vaccine (Hep B) (Series Information) Aged Out     No completion history exists for this topic.              HPV Vaccines (Series Information) Aged Out     No completion history exists for this topic.              Polio Vaccine (Inactivated " Polio) (Series Information) Aged Out     No completion history exists for this topic.              Meningococcal Immunization (Series Information) Aged Out     No completion history exists for this topic.                                 Medication review:    Current Outpatient Medications   Medication Sig Dispense Refill    tamsulosin (FLOMAX) 0.4 MG capsule Take 2 Capsules by mouth 1/2 hour after breakfast. 180 Capsule 2    tamsulosin (FLOMAX) 0.4 MG capsule TAKE 1 CAPSULE BY MOUTH 30 MINUTES AFTER BREAKFAST 90 Capsule 3    rosuvastatin (CRESTOR) 40 MG tablet Take 1 Tablet by mouth every day. Please keep follow up appointment for further refills. Thank you! 90 Tablet 2    carvedilol (COREG) 3.125 MG Tab Take 1 Tablet by mouth 2 times a day with meals. 180 Tablet 3    ezetimibe (ZETIA) 10 MG Tab Take 1 Tablet by mouth every day. 90 Tablet 3    ciprofloxacin/dexamethasone (CIPRODEX) 0.3-0.1 % Suspension UTD      aspirin (ASA) 325 MG TABS Take 325 mg by mouth every day.       No current facility-administered medications for this visit.            If opioid/benzo/sedative on med list discussion of reduction/elimination - referral         At this point document if pt refuses to consider changes off of controlled substance          Vital sign review - unintentional >10 pound weight loss evaluation (BMI and Muscle Mass review)          Whisper test fail - audiology referral          Eye test fail - ophthalmology referral          Stay independent checklist - PT referral for a score of 4 or more          PHQ evaluation - consider visit for medication change/referral          Mini-cog evaluation - consider visit for further evaluation/referral          POLST on file- if not need to have on hand for patient to fill out - consider Palliative referral etc.         CAESAR screen -         Audit-C Questionnaire         1. How often did you have a drink containing alcohol in the past year?     Never (0 points)  * If you answered  Never, score questions 2 and 3 below as zero.      Monthly or less (1 point)     2 to 4 times a month (2 points)     2 or 3 times per week (3 points)     4 or more times a week (4 points)         0         2. How many drinks did you have on a typical day when you were drinking in the past year?     1 - 2 (0 points)     3 - 4 (1point)     5 - 6 (2 points)     7 - 9 (3 points)     10 or more (4 points)         0         3. How often did you have 6 or more drinks on one occasion in the past year?     Never (0 points)     Less than monthly (1 point)     Monthly (2 points)     Weekly (3 points)     Daily or almost daily (4 points)         0         Total: 0         Scorin or more for men and 3 or more for women is an indication for further evaluation for hazardous drinking.         Chronic pain screen:              PEG: A Three-Item Scale Assessing Pain Intensity and Interference    1. What number best describes your pain on average in the past week?     0       1         2              3                     4                          5              6               7             8           9            10    No pain                                                                                                            Pain as bad as you can imagine         0         2. What number best describes how, during the past week, pain has interfered    with your enjoyment of life?    0       1         2              3                     4                          5              6               7             8           9            10    No pain                                                                                                            Pain as bad as you can imagine         0    3. What number best describes how, during the past week, pain has interfered    with your general activity?     0       1         2              3                     4                          5              6               7      "        8           9            10    No pain                                                                                                            Pain as bad as you can imagine         0         Scoring: average of 3 scales: 0    Serves as a baseline measurement for chronic pain issues and quality of life and can be used to prompt pain clinic referral.                   Tobacco use - consider visit for discussion if user - discuss alternatives and quitting         Food insecurity screen - (The Hunger Vital Sign)    Within the past 12 months were you worried whether food would run out before you got money to buy more?    Often true          sometimes true          never true    NEVER         Within the past 12 months did the food you bought not last and you did not have money to buy more food?    Often true         sometimes  true          never true    NEVER         Scoring: answering \"often true\" or \"sometimes true\" to either question is a positive screen for food insecurity and should prompt a social work/ referral if needed         Review of Rise and walk test - PT referral for a score over 13 seconds         Clasp arms behind back and head test - have the patient try and clasp hands behind back and then over head, failure to accomplish may indicate shoulder issues- PT/ortho/pmr referral               Assessment and Plan. The following treatment and monitoring plan is recommended:                  Services suggested: No services needed at this time    Health Care Screening: Age-appropriate preventive services recommended by USPTF and ACIP covered by Medicare were discussed today. Services ordered if indicated and agreed upon by the patient.    Referrals offered: Community-based lifestyle interventions to reduce health risks and promote self-management and wellness, fall prevention, nutrition, physical activity, tobacco-use cessation, weight loss, and mental health services as per orders if " indicated.         Discussion today about general wellness and lifestyle habits:                 Prevent falls and reduce trip hazards; Cautioned about securing or removing rugs.                  Have a working fire alarm and carbon monoxide detector;               Engage in regular physical activity and social activities       1. Medicare annual wellness visit, initial  completed    2. Hospital discharge follow-up  See below    3. Urinary retention  See below  - tamsulosin (FLOMAX) 0.4 MG capsule; Take 2 Capsules by mouth 1/2 hour after breakfast.  Dispense: 180 Capsule; Refill: 2    4. Hyperlipidemia, mixed  Patient has been stable with current management  We will make no changes for now      5. Essential hypertension  Patient has been stable with current management  We will make no changes for now          Assessment & Plan  1. Urinary retention.  His urinary retention is likely due to an enlarged prostate, which is common at his age of 74. He has been self-catheterizing 2 to 3 times a day, yielding about 450 mL of urine each time. His kidney function has shown improvement following the resolution of the blockage. He was started on Flomax (tamsulosin) 0.4 mg on January 17, but it may take up to 4 weeks to show effectiveness. Given that the current dose may not be sufficient, the dosage will be increased to 0.8 mg. He is advised to rise slowly from a seated position to mitigate potential dizziness. The prescription for the increased dose has been sent to [facility]. He should continue to follow up with his urologist and inform them of the changes in his medication regimen. If the increased dose of Flomax does not alleviate his symptoms, the addition of a third agent or a referral to urology for a prostate reduction procedure will be considered.    2. Pedal edema.  He reports that his feet were swollen, which has now resolved. This was likely due to urinary retention.    3. Health maintenance.  Refused  vaccines    PROCEDURE  Cystoscopy in 2018 revealed no abnormalities.        Follow-up: Return in about 6 months (around 8/12/2025) for Reevaluation.

## 2025-03-07 ENCOUNTER — OFFICE VISIT (OUTPATIENT)
Dept: UROLOGY | Facility: MEDICAL CENTER | Age: 75
End: 2025-03-07
Payer: MEDICARE

## 2025-03-07 DIAGNOSIS — R33.9 URINARY RETENTION: ICD-10-CM

## 2025-03-07 PROCEDURE — 99212 OFFICE O/P EST SF 10 MIN: CPT | Performed by: STUDENT IN AN ORGANIZED HEALTH CARE EDUCATION/TRAINING PROGRAM

## 2025-03-07 NOTE — PROGRESS NOTES
Subjective  Samuel Luong is a 75 y.o. male who presents today for follow up of BPH/LUTS with retention.    Patient states he has started urinating to a limited extent on his own, feels his stream is slightly improved as well. He reports having increased sensation to void. Still catheterizing 3x/day volumes typically less than 500cc.     Family History   Problem Relation Age of Onset    Non-contributory Mother     Non-contributory Father        Social History     Socioeconomic History    Marital status:      Spouse name: Not on file    Number of children: Not on file    Years of education: Not on file    Highest education level: Master's degree (e.g., MA, MS, [employer]ch[employer], MEd, MSW, MEENU)   Occupational History    Not on file   Tobacco Use    Smoking status: Never    Smokeless tobacco: Never   Vaping Use    Vaping status: Never Used   Substance and Sexual Activity    Alcohol use: Yes     Comment: occ.    Drug use: Never    Sexual activity: Not Currently     Birth control/protection: None   Other Topics Concern    Not on file   Social History Narrative    Not on file     Social Drivers of Health     Financial Resource Strain: Low Risk  (2/10/2025)    Overall Financial Resource Strain (CARDIA)     Difficulty of Paying Living Expenses: Not hard at all   Food Insecurity: No Food Insecurity (2/10/2025)    Hunger Vital Sign     Worried About Running Out of Food in the Last Year: Never true     Ran Out of Food in the Last Year: Never true   Transportation Needs: No Transportation Needs (2/10/2025)    PRAPARE - Transportation     Lack of Transportation (Medical): No     Lack of Transportation (Non-Medical): No   Physical Activity: Insufficiently Active (2/10/2025)    Exercise Vital Sign     Days of Exercise per Week: 5 days     Minutes of Exercise per Session: 20 min   Stress: No Stress Concern Present (2/10/2025)    [employer] [place] [employer] - Occupational Stress Questionnaire     Feeling of Stress : Not  at all   Social Connections: Socially Integrated (2/10/2025)    Social Connection and Isolation Panel [NHANES]     Frequency of Communication with Friends and Family: More than three times a week     Frequency of Social Gatherings with Friends and Family: More than three times a week     Attends Zoroastrian Services: More than 4 times per year     Active Member of Clubs or Organizations: Yes     Attends Club or Organization Meetings: More than 4 times per year     Marital Status:    Intimate Partner Violence: Not on file   Housing Stability: Low Risk  (2/10/2025)    Housing Stability Vital Sign     Unable to Pay for Housing in the Last Year: No     Number of Times Moved in the Last Year: 0     Homeless in the Last Year: No       No past surgical history on file.    Past Medical History:   Diagnosis Date    Acute MI, anterolateral wall (HCC)     Robert-lateral-apical MI    CAD (coronary artery disease) 12/21/2011    Chest pain, unspecified     HTN (hypertension) 2/5/2013    Hyperlipidemia, mixed     Presence of drug coated stent in LAD coronary artery     2009 3.0 x 18mm drug eluting stent too proximal LAD       Current Outpatient Medications   Medication Sig Dispense Refill    tamsulosin (FLOMAX) 0.4 MG capsule Take 2 Capsules by mouth 1/2 hour after breakfast. 180 Capsule 2    tamsulosin (FLOMAX) 0.4 MG capsule TAKE 1 CAPSULE BY MOUTH 30 MINUTES AFTER BREAKFAST 90 Capsule 3    rosuvastatin (CRESTOR) 40 MG tablet Take 1 Tablet by mouth every day. Please keep follow up appointment for further refills. Thank you! 90 Tablet 2    carvedilol (COREG) 3.125 MG Tab Take 1 Tablet by mouth 2 times a day with meals. 180 Tablet 3    ezetimibe (ZETIA) 10 MG Tab Take 1 Tablet by mouth every day. 90 Tablet 3    ciprofloxacin/dexamethasone (CIPRODEX) 0.3-0.1 % Suspension UTD      aspirin (ASA) 325 MG TABS Take 325 mg by mouth every day.       No current facility-administered medications for this visit.       Allergies    Allergen Reactions    Penicillins Rash    Statins [Hmg-Coa-R Inhibitors]      myalgia       Objective  There were no vitals taken for this visit.  Physical Exam  Constitutional:       Appearance: Normal appearance.   HENT:      Head: Normocephalic and atraumatic.   Eyes:      Extraocular Movements: Extraocular movements intact.      Conjunctiva/sclera: Conjunctivae normal.   Pulmonary:      Effort: No respiratory distress.   Musculoskeletal:      Cervical back: Normal range of motion.   Skin:     General: Skin is warm and dry.   Neurological:      General: No focal deficit present.      Mental Status: He is alert.   Psychiatric:         Mood and Affect: Mood normal.         Labs:   CBC   Lab Results   Component Value Date/Time    WBC 13.8 (H) 01/24/2025 0800    RBC 4.80 01/24/2025 0800    HEMOGLOBIN 14.9 01/24/2025 0800    HEMATOCRIT 46.3 01/24/2025 0800    MCV 96.5 01/24/2025 0800    MCH 31.0 01/24/2025 0800    MCHC 32.2 (L) 01/24/2025 0800    RDW 45.3 01/24/2025 0800    MPV 9.7 01/24/2025 0800    LYMPHOCYTES 8.40 (L) 01/24/2025 0800    LYMPHS 1.16 01/24/2025 0800    MONOCYTES 9.60 01/24/2025 0800    MONOS 1.32 (H) 01/24/2025 0800    EOSINOPHILS 1.00 01/24/2025 0800    EOS 0.14 01/24/2025 0800    BASOPHILS 0.40 01/24/2025 0800    BASO 0.05 01/24/2025 0800    NRBC 0.00 01/24/2025 0800       BMP   Lab Results   Component Value Date/Time    SODIUM 139 01/24/2025 0800    POTASSIUM 4.1 01/24/2025 0800    CHLORIDE 106 01/24/2025 0800    CO2 24 01/24/2025 0800    GLUCOSE 108 (H) 01/24/2025 0800    BUN 17 01/24/2025 0800    CREATININE 1.19 01/24/2025 0800    CALCIUM 9.3 01/24/2025 0800           Imaging:     none    Assessment    BPH/LUTS with retention  -Continue tamsulosin 0.4 mg daily, discussed BID dosing can have increased hypotension, only 10-20% bump in efficacy at max.   -continue CIC 3x/day, given he is still having some improvement in function and sensation we will hold off on additional workup at this  time    Plan    Problem List Items Addressed This Visit       Urinary retention     RTC 3 months with PVR

## 2025-03-31 DIAGNOSIS — E78.2 HYPERLIPIDEMIA, MIXED: ICD-10-CM

## 2025-03-31 NOTE — TELEPHONE ENCOUNTER
Is the patient due for a refill? Yes    Was the patient seen the last 15 months? Yes    Date of last office visit: 02/04/2025    Does the patient have an upcoming appointment?  No    Provider to refill:TW    Does the patient have [facility]P Plus and need 100-day supply? (This applies to ALL medications) Patient does not have SCP

## 2025-04-01 RX ORDER — EZETIMIBE 10 MG/1
10 TABLET ORAL DAILY
Qty: 90 TABLET | Refills: 4 | Status: SHIPPED | OUTPATIENT
Start: 2025-04-01

## 2025-06-06 ENCOUNTER — OFFICE VISIT (OUTPATIENT)
Dept: UROLOGY | Facility: MEDICAL CENTER | Age: 75
End: 2025-06-06
Payer: MEDICARE

## 2025-06-06 DIAGNOSIS — I21.09: ICD-10-CM

## 2025-06-06 DIAGNOSIS — I10 ESSENTIAL HYPERTENSION: ICD-10-CM

## 2025-06-06 DIAGNOSIS — R33.9 URINARY RETENTION: Primary | ICD-10-CM

## 2025-06-06 LAB
POC POST-VOID: NORMAL
POC PRE-VOID: 243 ML

## 2025-06-06 PROCEDURE — 51798 US URINE CAPACITY MEASURE: CPT | Performed by: STUDENT IN AN ORGANIZED HEALTH CARE EDUCATION/TRAINING PROGRAM

## 2025-06-06 PROCEDURE — 99213 OFFICE O/P EST LOW 20 MIN: CPT | Performed by: STUDENT IN AN ORGANIZED HEALTH CARE EDUCATION/TRAINING PROGRAM

## 2025-06-06 RX ORDER — CARVEDILOL 3.12 MG/1
3.12 TABLET ORAL 2 TIMES DAILY WITH MEALS
Qty: 180 TABLET | Refills: 2 | Status: SHIPPED | OUTPATIENT
Start: 2025-06-06

## 2025-06-06 NOTE — PROGRESS NOTES
Subjective  Samuel Luong is a 75 y.o. male who presents today for follow up of urinary retention.    He is still catheterizing 3x/day, volumes ranging from 500-800cc. He is voiding spontaneously in between caths. Taking flomax 0.4 mg daily, he had symptomatic hypotension when taking 0.8 mg. No issues passing catheters.    Family History   Problem Relation Age of Onset    Non-contributory Mother     Non-contributory Father        Social History     Socioeconomic History    Marital status:      Spouse name: Not on file    Number of children: Not on file    Years of education: Not on file    Highest education level: Master's degree (e.g., MA, MS, Conchis, MEd, MSW, MEENU)   Occupational History    Not on file   Tobacco Use    Smoking status: Never    Smokeless tobacco: Never   Vaping Use    Vaping status: Never Used   Substance and Sexual Activity    Alcohol use: Yes     Comment: occ.    Drug use: Never    Sexual activity: Not Currently     Birth control/protection: None   Other Topics Concern    Not on file   Social History Narrative    Not on file     Social Drivers of Health     Financial Resource Strain: Low Risk  (2/10/2025)    Overall Financial Resource Strain (CARDIA)     Difficulty of Paying Living Expenses: Not hard at all   Food Insecurity: No Food Insecurity (2/10/2025)    Hunger Vital Sign     Worried About Running Out of Food in the Last Year: Never true     Ran Out of Food in the Last Year: Never true   Transportation Needs: No Transportation Needs (2/10/2025)    PRAPARE - Transportation     Lack of Transportation (Medical): No     Lack of Transportation (Non-Medical): No   Physical Activity: Insufficiently Active (2/10/2025)    Exercise Vital Sign     Days of Exercise per Week: 5 days     Minutes of Exercise per Session: 20 min   Stress: No Stress Concern Present (2/10/2025)    [employer] [place] [employer] - Occupational Stress Questionnaire     Feeling of Stress : Not at all    Social Connections: Socially Integrated (2/10/2025)    Social Connection and Isolation Panel [NHANES]     Frequency of Communication with Friends and Family: More than three times a week     Frequency of Social Gatherings with Friends and Family: More than three times a week     Attends Religion Services: More than 4 times per year     Active Member of Clubs or Organizations: Yes     Attends Club or Organization Meetings: More than 4 times per year     Marital Status:    Intimate Partner Violence: Not on file   Housing Stability: Low Risk  (2/10/2025)    Housing Stability Vital Sign     Unable to Pay for Housing in the Last Year: No     Number of Times Moved in the Last Year: 0     Homeless in the Last Year: No       Past Surgical History[1]    Past Medical History[2]    Current Medications[3]    Allergies[4]    Objective  There were no vitals taken for this visit.  Physical Exam  Constitutional:       Appearance: Normal appearance.   HENT:      Head: Normocephalic and atraumatic.   Eyes:      Extraocular Movements: Extraocular movements intact.      Conjunctiva/sclera: Conjunctivae normal.   Pulmonary:      Effort: No respiratory distress.   Musculoskeletal:      Cervical back: Normal range of motion.   Skin:     General: Skin is warm and dry.   Neurological:      General: No focal deficit present.      Mental Status: He is alert.   Psychiatric:         Mood and Affect: Mood normal.         Labs:   CBC   Lab Results   Component Value Date/Time    WBC 13.8 (H) 01/24/2025 0800    RBC 4.80 01/24/2025 0800    HEMOGLOBIN 14.9 01/24/2025 0800    HEMATOCRIT 46.3 01/24/2025 0800    MCV 96.5 01/24/2025 0800    MCH 31.0 01/24/2025 0800    MCHC 32.2 (L) 01/24/2025 0800    RDW 45.3 01/24/2025 0800    MPV 9.7 01/24/2025 0800    LYMPHOCYTES 8.40 (L) 01/24/2025 0800    LYMPHS 1.16 01/24/2025 0800    MONOCYTES 9.60 01/24/2025 0800    MONOS 1.32 (H) 01/24/2025 0800    EOSINOPHILS 1.00 01/24/2025 0800    EOS 0.14  01/24/2025 0800    BASOPHILS 0.40 01/24/2025 0800    BASO 0.05 01/24/2025 0800    NRBC 0.00 01/24/2025 0800       BMP   Lab Results   Component Value Date/Time    SODIUM 139 01/24/2025 0800    POTASSIUM 4.1 01/24/2025 0800    CHLORIDE 106 01/24/2025 0800    CO2 24 01/24/2025 0800    GLUCOSE 108 (H) 01/24/2025 0800    BUN 17 01/24/2025 0800    CREATININE 1.19 01/24/2025 0800    CALCIUM 9.3 01/24/2025 0800           Imaging:     none    Assessment    BPH/LUTS with urinary retention  -Given he has not been able to d/c CIC we can proceed with urodynamics to determine if he would benefit from a SANCHEZ procedure or SNM. Discussed what to expect during the procedure, he agrees to proceed    Plan    Problem List Items Addressed This Visit       Urinary retention - Primary    Relevant Orders    POCT Bladder Scan (Completed)    URODYNAMIC STUDIES    URINALYSIS     RTC for urodynamics       [1] History reviewed. No pertinent surgical history.  [2]   Past Medical History:  Diagnosis Date    Acute MI, anterolateral wall (HCC)     Robert-lateral-apical MI    CAD (coronary artery disease) 12/21/2011    Chest pain, unspecified     HTN (hypertension) 2/5/2013    Hyperlipidemia, mixed     Presence of drug coated stent in LAD coronary artery     2009 3.0 x 18mm drug eluting stent too proximal LAD   [3]   Current Outpatient Medications   Medication Sig Dispense Refill    ezetimibe (ZETIA) 10 MG Tab Take 1 Tablet by mouth every day. 90 Tablet 4    tamsulosin (FLOMAX) 0.4 MG capsule Take 2 Capsules by mouth 1/2 hour after breakfast. 180 Capsule 2    rosuvastatin (CRESTOR) 40 MG tablet Take 1 Tablet by mouth every day. Please keep follow up appointment for further refills. Thank you! 90 Tablet 2    aspirin (ASA) 325 MG TABS Take 325 mg by mouth every day.      carvedilol (COREG) 3.125 MG Tab Take 1 Tablet by mouth 2 times a day with meals. 180 Tablet 2    tamsulosin (FLOMAX) 0.4 MG capsule TAKE 1 CAPSULE BY MOUTH 30 MINUTES AFTER BREAKFAST  90 Capsule 3    ciprofloxacin/dexamethasone (CIPRODEX) 0.3-0.1 % Suspension UTD       No current facility-administered medications for this visit.   [4]   Allergies  Allergen Reactions    Penicillins Rash    Statins [Hmg-Coa-R Inhibitors]      myalgia

## 2025-06-06 NOTE — TELEPHONE ENCOUNTER
Is the patient due for a refill? Yes    Was the patient seen the last 15 months? Yes    Date of last office visit: 2/4/2025     Does the patient have an upcoming appointment?  No       Provider to refill:TW    Does the patient have [facility] Plus and need 100-day supply? (This applies to ALL medications) Patient does not have SCP

## 2025-06-20 NOTE — Clinical Note
REFERRAL APPROVAL NOTICE         Sent on June 20, 2025                   Samuel Luong  [address] [place] [address]                   Dear Mr. Luong,    After a careful review of the medical information and benefit coverage, [facility] has processed your referral. See below for additional details.    If applicable, you must be actively enrolled with your insurance for coverage of the authorized service. If you have any questions regarding your coverage, please contact your insurance directly.    REFERRAL INFORMATION   Referral #:  [id number]  Referred-To Department    Referred-By Provider:  Urology    Jaz Muse M.D.   [facility]      [address]  [place] [address]  [phone] [address]  [place] [id number] [phone]    Referral Start Date:  06/06/2025  Referral End Date:   06/20/2027             SCHEDULING  If you do not already have an appointment, please call [phone] to make an appointment.     MORE INFORMATION  If you do not already have a [employer] account, sign up at: [email or web address]  You can access your medical information, make appointments, see lab results, billing information, and more.  If you have questions regarding this referral, please contact  the [facility] Referrals department at:             [phone]. Monday - Friday 8:00AM - 5:00PM.     Sincerely,    [facility]

## 2025-08-05 DIAGNOSIS — E78.2 MIXED HYPERLIPIDEMIA: ICD-10-CM

## 2025-08-05 RX ORDER — ROSUVASTATIN CALCIUM 40 MG/1
40 TABLET, COATED ORAL
Qty: 90 TABLET | Refills: 1 | Status: SHIPPED | OUTPATIENT
Start: 2025-08-05

## 2025-09-22 ENCOUNTER — APPOINTMENT (OUTPATIENT)
Dept: UROLOGY | Facility: MEDICAL CENTER | Age: 75
End: 2025-09-22
Attending: STUDENT IN AN ORGANIZED HEALTH CARE EDUCATION/TRAINING PROGRAM
Payer: MEDICARE